# Patient Record
Sex: MALE | Race: BLACK OR AFRICAN AMERICAN | NOT HISPANIC OR LATINO
[De-identification: names, ages, dates, MRNs, and addresses within clinical notes are randomized per-mention and may not be internally consistent; named-entity substitution may affect disease eponyms.]

---

## 2022-03-21 PROBLEM — Z00.00 ENCOUNTER FOR PREVENTIVE HEALTH EXAMINATION: Status: ACTIVE | Noted: 2022-03-21

## 2022-03-25 ENCOUNTER — APPOINTMENT (OUTPATIENT)
Dept: UROLOGY | Facility: CLINIC | Age: 57
End: 2022-03-25
Payer: SELF-PAY

## 2022-03-25 VITALS
HEART RATE: 77 BPM | BODY MASS INDEX: 27.77 KG/M2 | TEMPERATURE: 98.3 F | DIASTOLIC BLOOD PRESSURE: 75 MMHG | WEIGHT: 205 LBS | HEIGHT: 72 IN | OXYGEN SATURATION: 97 % | SYSTOLIC BLOOD PRESSURE: 120 MMHG

## 2022-03-25 PROCEDURE — 51798 US URINE CAPACITY MEASURE: CPT

## 2022-03-25 PROCEDURE — 99204 OFFICE O/P NEW MOD 45 MIN: CPT

## 2022-03-25 RX ORDER — TAMSULOSIN HYDROCHLORIDE 0.4 MG/1
CAPSULE ORAL
Refills: 0 | Status: ACTIVE | COMMUNITY

## 2022-03-25 RX ORDER — CHROMIUM 200 MCG
TABLET ORAL
Refills: 0 | Status: ACTIVE | COMMUNITY

## 2022-03-25 RX ORDER — METFORMIN HYDROCHLORIDE 625 MG/1
TABLET ORAL
Refills: 0 | Status: ACTIVE | COMMUNITY

## 2022-03-28 LAB
APPEARANCE: CLEAR
BACTERIA UR CULT: NORMAL
BACTERIA: NEGATIVE
BILIRUBIN URINE: NEGATIVE
BLOOD URINE: NEGATIVE
COLOR: YELLOW
GLUCOSE QUALITATIVE U: NEGATIVE
HYALINE CASTS: 3 /LPF
KETONES URINE: NORMAL
LEUKOCYTE ESTERASE URINE: NEGATIVE
MICROSCOPIC-UA: NORMAL
NITRITE URINE: NEGATIVE
PH URINE: 5.5
PROTEIN URINE: NORMAL
PSA FREE FLD-MCNC: 7 %
PSA FREE SERPL-MCNC: 22.8 NG/ML
PSA SERPL-MCNC: 306 NG/ML
RED BLOOD CELLS URINE: 0 /HPF
SPECIFIC GRAVITY URINE: 1.03
SQUAMOUS EPITHELIAL CELLS: 1 /HPF
UROBILINOGEN URINE: NORMAL
WHITE BLOOD CELLS URINE: 1 /HPF

## 2022-03-29 ENCOUNTER — APPOINTMENT (OUTPATIENT)
Dept: UROLOGY | Facility: CLINIC | Age: 57
End: 2022-03-29
Payer: SELF-PAY

## 2022-03-29 VITALS
SYSTOLIC BLOOD PRESSURE: 125 MMHG | HEART RATE: 85 BPM | DIASTOLIC BLOOD PRESSURE: 80 MMHG | OXYGEN SATURATION: 100 % | BODY MASS INDEX: 27.8 KG/M2 | WEIGHT: 205 LBS | TEMPERATURE: 97.8 F

## 2022-03-29 PROCEDURE — 55700: CPT

## 2022-03-29 PROCEDURE — 76872 US TRANSRECTAL: CPT

## 2022-04-01 ENCOUNTER — APPOINTMENT (OUTPATIENT)
Dept: UROLOGY | Facility: CLINIC | Age: 57
End: 2022-04-01
Payer: SELF-PAY

## 2022-04-01 ENCOUNTER — NON-APPOINTMENT (OUTPATIENT)
Age: 57
End: 2022-04-01

## 2022-04-01 ENCOUNTER — RESULT REVIEW (OUTPATIENT)
Age: 57
End: 2022-04-01

## 2022-04-01 PROCEDURE — 99214 OFFICE O/P EST MOD 30 MIN: CPT

## 2022-04-02 ENCOUNTER — APPOINTMENT (OUTPATIENT)
Dept: CT IMAGING | Facility: IMAGING CENTER | Age: 57
End: 2022-04-02
Payer: SELF-PAY

## 2022-04-02 ENCOUNTER — OUTPATIENT (OUTPATIENT)
Dept: OUTPATIENT SERVICES | Facility: HOSPITAL | Age: 57
LOS: 1 days | End: 2022-04-02
Payer: SELF-PAY

## 2022-04-02 DIAGNOSIS — C61 MALIGNANT NEOPLASM OF PROSTATE: ICD-10-CM

## 2022-04-02 PROCEDURE — 74177 CT ABD & PELVIS W/CONTRAST: CPT | Mod: 26

## 2022-04-02 PROCEDURE — 74177 CT ABD & PELVIS W/CONTRAST: CPT

## 2022-04-04 ENCOUNTER — APPOINTMENT (OUTPATIENT)
Dept: NUCLEAR MEDICINE | Facility: IMAGING CENTER | Age: 57
End: 2022-04-04
Payer: SELF-PAY

## 2022-04-04 ENCOUNTER — OUTPATIENT (OUTPATIENT)
Dept: OUTPATIENT SERVICES | Facility: HOSPITAL | Age: 57
LOS: 1 days | End: 2022-04-04
Payer: SELF-PAY

## 2022-04-04 DIAGNOSIS — C61 MALIGNANT NEOPLASM OF PROSTATE: ICD-10-CM

## 2022-04-04 PROCEDURE — A9561: CPT

## 2022-04-04 PROCEDURE — 78306 BONE IMAGING WHOLE BODY: CPT

## 2022-04-04 PROCEDURE — 78306 BONE IMAGING WHOLE BODY: CPT | Mod: 26

## 2022-04-08 ENCOUNTER — TRANSCRIPTION ENCOUNTER (OUTPATIENT)
Age: 57
End: 2022-04-08

## 2022-04-09 NOTE — END OF VISIT
[FreeTextEntry3] : Medical record entries made by the scribe today, were at my direction and personally dictated to them by me, Dr. Chun Wong on 03/25/2022. I have reviewed the chart and agree that the record accurately reflects my personal performance of the history, physical exam, assessment, and plan.

## 2022-04-09 NOTE — HISTORY OF PRESENT ILLNESS
[FreeTextEntry1] : 56 year old male with elevated PSA\par IPSS: 6\par weak stream\par \par on flomax 0.4, proscar \par on diamicron - sulfonylurea\par \par denies pain, dysuria\par \par PSA 2/11/22: 291.70, 10.21% FREE\par \par to obtain records from old office\par prostate biopsy was recommended then patient postponed due to covid-lives in [place]-unable to get back here

## 2022-04-09 NOTE — ASSESSMENT
[FreeTextEntry1] : 56 year old male with Faina 8 (4+4) prostate cancer. PSA of 306\par 30 minute discussion with Pt and his mother(retired  RN) regarding pathology and options\par \par Significance of Honeydew 8 iscussed as well as elev psa to over 300\par \par Reviewed all options including active surveillance vs definitive therapy\par Recommended definitive therapy, we discussed rad surg, XRT, HIFU, cryo amongst others\par Risk/benefits reviewed as well- answered all questions. Patient understood \par \par if no evidence of spread- surgery is good option. patient agrees.\par to have CT scan, bone scan

## 2022-04-09 NOTE — ASSESSMENT
[FreeTextEntry1] : 56 year old male with elevated PSA\par UA micro, culture, Free PSA sent today\par \par PVR: 2\par Discussed need for prostate biopsy. Rationale, benefit, risk, and alternatives reviewed. All questions were answered. Pt understood. \par \par Follow up for prostate biopsy ASAP as pt is returning to [place] on 4/ 6

## 2022-04-09 NOTE — HISTORY OF PRESENT ILLNESS
[FreeTextEntry1] : 56 year old male is here to discuss biopsy results.\par  Pt is doing well since the procedure-\par \par Biopsy on 3/28/22 : right- adenocarcinoma of the prostate, Prostatic grade group 4. (Faina 4+4= 8) in 6 cores\par PSA 3/28/22: 306, 7% free\par \par exam 3/25 very asymmetric prostate, larger on the right, right has some nodularity. Smaller on the left. \par

## 2022-04-09 NOTE — PHYSICAL EXAM
[General Appearance - Well Developed] : well developed [General Appearance - Well Nourished] : well nourished [Normal Appearance] : normal appearance [Well Groomed] : well groomed [General Appearance - In No Acute Distress] : no acute distress [Edema] : no peripheral edema [Respiration, Rhythm And Depth] : normal respiratory rhythm and effort [Exaggerated Use Of Accessory Muscles For Inspiration] : no accessory muscle use [Abdomen Soft] : soft [Abdomen Tenderness] : non-tender [Costovertebral Angle Tenderness] : no ~M costovertebral angle tenderness [Urethral Meatus] : meatus normal [Urinary Bladder Findings] : the bladder was normal on palpation [Scrotum] : the scrotum was normal [Testes Mass (___cm)] : there were no testicular masses [Normal Station and Gait] : the gait and station were normal for the patient's age [] : no rash [No Focal Deficits] : no focal deficits [Oriented To Time, Place, And Person] : oriented to person, place, and time [Affect] : the affect was normal [Mood] : the mood was normal [Not Anxious] : not anxious [No Palpable Adenopathy] : no palpable adenopathy [Prostate Size ___ (0-4)] : prostate size [unfilled] (scale: 0-4) [Penis Abnormality] : normal uncircumcised penis [Epididymis] : the epididymides were normal [Testes Tenderness] : no tenderness of the testes [Anus Abnormality] : the anus and perineum were normal [Rectal Exam - Rectum] : digital rectal exam was normal [FreeTextEntry1] : very assymetric prostate, larger on the right, right has some nodularity. Smaller on the left

## 2022-04-09 NOTE — END OF VISIT
[FreeTextEntry3] : Medical record entries made by the scribe today, were at my direction and personally dictated to them by me, Dr. Chun Wong on 04/01/2022. I have reviewed the chart and agree that the record accurately reflects my personal performance of the history, physical exam, assessment, and plan.

## 2022-04-11 LAB — CORE LAB BIOPSY: NORMAL

## 2022-04-12 ENCOUNTER — TRANSCRIPTION ENCOUNTER (OUTPATIENT)
Age: 57
End: 2022-04-12

## 2022-04-12 ENCOUNTER — NON-APPOINTMENT (OUTPATIENT)
Age: 57
End: 2022-04-12

## 2022-04-18 ENCOUNTER — APPOINTMENT (OUTPATIENT)
Dept: UROLOGY | Facility: CLINIC | Age: 57
End: 2022-04-18
Payer: SELF-PAY

## 2022-04-18 VITALS
BODY MASS INDEX: 27.8 KG/M2 | OXYGEN SATURATION: 99 % | WEIGHT: 205 LBS | TEMPERATURE: 98.3 F | SYSTOLIC BLOOD PRESSURE: 124 MMHG | DIASTOLIC BLOOD PRESSURE: 78 MMHG | HEART RATE: 89 BPM

## 2022-04-18 DIAGNOSIS — R97.20 ELEVATED PROSTATE, SPECIFIC ANTIGEN [PSA]: ICD-10-CM

## 2022-04-18 DIAGNOSIS — N40.2 NODULAR PROSTATE W/OUT LOWER URINARY TRACT SYMPTOMS: ICD-10-CM

## 2022-04-18 PROCEDURE — 99215 OFFICE O/P EST HI 40 MIN: CPT

## 2022-04-18 NOTE — HISTORY OF PRESENT ILLNESS
[FreeTextEntry1] : 57 y/o with prostate cancer (PSA 306ng/mL, Faina 4+4, Mian 4).\par Prostate diameters: 4.5x4.1x5.3cm.\par CT negative for Lymphadenopathy.\par Bone scan negative for metastases.

## 2022-04-18 NOTE — ASSESSMENT
[FreeTextEntry1] : The patient was made aware of the all the possible therapeutic approaches for his disease.\par The chosen one was a multimodal approach, starting from a RALP + left monolaterale nerve sparing (if possible, to be checked intraoperatively) + bilateral LND with extended pattern (optional superextended in case needed).\par \par Possible continence and erection post/op issues discussed.\par Pt acknowledged about procedure details.\par \par Planned prostate MRI for Pre/OP planning (mostly about NS)\par \par

## 2022-04-18 NOTE — PHYSICAL EXAM
[General Appearance - Well Developed] : well developed [General Appearance - Well Nourished] : well nourished [Normal Appearance] : normal appearance [Well Groomed] : well groomed [General Appearance - In No Acute Distress] : no acute distress [Abdomen Soft] : soft [Abdomen Tenderness] : non-tender [Costovertebral Angle Tenderness] : no ~M costovertebral angle tenderness [Urethral Meatus] : meatus normal [Urinary Bladder Findings] : the bladder was normal on palpation [Scrotum] : the scrotum was normal [Testes Mass (___cm)] : there were no testicular masses [Prostate Size ___ (0-4)] : prostate size [unfilled] (scale: 0-4) [Edema] : no peripheral edema [] : no respiratory distress [Respiration, Rhythm And Depth] : normal respiratory rhythm and effort [Exaggerated Use Of Accessory Muscles For Inspiration] : no accessory muscle use [Oriented To Time, Place, And Person] : oriented to person, place, and time [Affect] : the affect was normal [Mood] : the mood was normal [Not Anxious] : not anxious [Normal Station and Gait] : the gait and station were normal for the patient's age [No Focal Deficits] : no focal deficits [No Palpable Adenopathy] : no palpable adenopathy [FreeTextEntry1] : Gland dimensions x3, right lobe with higher density, apparently fixed to the rectal wall. Left lobe soft. No pain upon palpation.

## 2022-05-10 ENCOUNTER — APPOINTMENT (OUTPATIENT)
Dept: MRI IMAGING | Facility: CLINIC | Age: 57
End: 2022-05-10

## 2022-05-18 ENCOUNTER — APPOINTMENT (OUTPATIENT)
Dept: MRI IMAGING | Facility: CLINIC | Age: 57
End: 2022-05-18

## 2022-06-01 ENCOUNTER — APPOINTMENT (OUTPATIENT)
Dept: NUCLEAR MEDICINE | Facility: CLINIC | Age: 57
End: 2022-06-01
Payer: COMMERCIAL

## 2022-06-01 ENCOUNTER — APPOINTMENT (OUTPATIENT)
Dept: NUCLEAR MEDICINE | Facility: CLINIC | Age: 57
End: 2022-06-01

## 2022-06-01 ENCOUNTER — OUTPATIENT (OUTPATIENT)
Dept: OUTPATIENT SERVICES | Facility: HOSPITAL | Age: 57
LOS: 1 days | End: 2022-06-01
Payer: SELF-PAY

## 2022-06-01 DIAGNOSIS — C61 MALIGNANT NEOPLASM OF PROSTATE: ICD-10-CM

## 2022-06-01 PROCEDURE — 78816 PET IMAGE W/CT FULL BODY: CPT

## 2022-06-01 PROCEDURE — 78816 PET IMAGE W/CT FULL BODY: CPT | Mod: 26

## 2022-06-01 PROCEDURE — A9595: CPT

## 2022-06-13 ENCOUNTER — OUTPATIENT (OUTPATIENT)
Dept: OUTPATIENT SERVICES | Facility: HOSPITAL | Age: 57
LOS: 1 days | End: 2022-06-13
Payer: COMMERCIAL

## 2022-06-13 VITALS
OXYGEN SATURATION: 97 % | RESPIRATION RATE: 16 BRPM | TEMPERATURE: 98 F | DIASTOLIC BLOOD PRESSURE: 82 MMHG | WEIGHT: 195.99 LBS | HEIGHT: 71.5 IN | SYSTOLIC BLOOD PRESSURE: 128 MMHG | HEART RATE: 68 BPM

## 2022-06-13 DIAGNOSIS — Z98.890 OTHER SPECIFIED POSTPROCEDURAL STATES: Chronic | ICD-10-CM

## 2022-06-13 DIAGNOSIS — C61 MALIGNANT NEOPLASM OF PROSTATE: ICD-10-CM

## 2022-06-13 DIAGNOSIS — I10 ESSENTIAL (PRIMARY) HYPERTENSION: ICD-10-CM

## 2022-06-13 DIAGNOSIS — E11.9 TYPE 2 DIABETES MELLITUS WITHOUT COMPLICATIONS: ICD-10-CM

## 2022-06-13 LAB
A1C WITH ESTIMATED AVERAGE GLUCOSE RESULT: 6.5 % — HIGH (ref 4–5.6)
ALBUMIN SERPL ELPH-MCNC: 4.9 G/DL — SIGNIFICANT CHANGE UP (ref 3.3–5)
ALP SERPL-CCNC: 56 U/L — SIGNIFICANT CHANGE UP (ref 40–120)
ALT FLD-CCNC: 12 U/L — SIGNIFICANT CHANGE UP (ref 4–41)
ANION GAP SERPL CALC-SCNC: 11 MMOL/L — SIGNIFICANT CHANGE UP (ref 7–14)
APPEARANCE UR: CLEAR — SIGNIFICANT CHANGE UP
AST SERPL-CCNC: 12 U/L — SIGNIFICANT CHANGE UP (ref 4–40)
BILIRUB SERPL-MCNC: 1 MG/DL — SIGNIFICANT CHANGE UP (ref 0.2–1.2)
BILIRUB UR-MCNC: NEGATIVE — SIGNIFICANT CHANGE UP
BLD GP AB SCN SERPL QL: NEGATIVE — SIGNIFICANT CHANGE UP
BUN SERPL-MCNC: 16 MG/DL — SIGNIFICANT CHANGE UP (ref 7–23)
CALCIUM SERPL-MCNC: 9.9 MG/DL — SIGNIFICANT CHANGE UP (ref 8.4–10.5)
CHLORIDE SERPL-SCNC: 104 MMOL/L — SIGNIFICANT CHANGE UP (ref 98–107)
CO2 SERPL-SCNC: 24 MMOL/L — SIGNIFICANT CHANGE UP (ref 22–31)
COLOR SPEC: YELLOW — SIGNIFICANT CHANGE UP
CREAT SERPL-MCNC: 1.3 MG/DL — SIGNIFICANT CHANGE UP (ref 0.5–1.3)
DIFF PNL FLD: NEGATIVE — SIGNIFICANT CHANGE UP
EGFR: 64 ML/MIN/1.73M2 — SIGNIFICANT CHANGE UP
ESTIMATED AVERAGE GLUCOSE: 140 — SIGNIFICANT CHANGE UP
GLUCOSE SERPL-MCNC: 127 MG/DL — HIGH (ref 70–99)
GLUCOSE UR QL: NEGATIVE — SIGNIFICANT CHANGE UP
HCT VFR BLD CALC: 38.9 % — LOW (ref 39–50)
HGB BLD-MCNC: 14 G/DL — SIGNIFICANT CHANGE UP (ref 13–17)
KETONES UR-MCNC: ABNORMAL
LEUKOCYTE ESTERASE UR-ACNC: NEGATIVE — SIGNIFICANT CHANGE UP
MCHC RBC-ENTMCNC: 28.9 PG — SIGNIFICANT CHANGE UP (ref 27–34)
MCHC RBC-ENTMCNC: 36 GM/DL — SIGNIFICANT CHANGE UP (ref 32–36)
MCV RBC AUTO: 80.4 FL — SIGNIFICANT CHANGE UP (ref 80–100)
NITRITE UR-MCNC: NEGATIVE — SIGNIFICANT CHANGE UP
NRBC # BLD: 0 /100 WBCS — SIGNIFICANT CHANGE UP
NRBC # FLD: 0 K/UL — SIGNIFICANT CHANGE UP
PH UR: 6 — SIGNIFICANT CHANGE UP (ref 5–8)
PLATELET # BLD AUTO: 198 K/UL — SIGNIFICANT CHANGE UP (ref 150–400)
POTASSIUM SERPL-MCNC: 4.5 MMOL/L — SIGNIFICANT CHANGE UP (ref 3.5–5.3)
POTASSIUM SERPL-SCNC: 4.5 MMOL/L — SIGNIFICANT CHANGE UP (ref 3.5–5.3)
PROT SERPL-MCNC: 7.8 G/DL — SIGNIFICANT CHANGE UP (ref 6–8.3)
PROT UR-MCNC: ABNORMAL
RBC # BLD: 4.84 M/UL — SIGNIFICANT CHANGE UP (ref 4.2–5.8)
RBC # FLD: 12.6 % — SIGNIFICANT CHANGE UP (ref 10.3–14.5)
RH IG SCN BLD-IMP: POSITIVE — SIGNIFICANT CHANGE UP
SODIUM SERPL-SCNC: 139 MMOL/L — SIGNIFICANT CHANGE UP (ref 135–145)
SP GR SPEC: 1.03 — SIGNIFICANT CHANGE UP (ref 1–1.05)
UROBILINOGEN FLD QL: SIGNIFICANT CHANGE UP
WBC # BLD: 7.85 K/UL — SIGNIFICANT CHANGE UP (ref 3.8–10.5)
WBC # FLD AUTO: 7.85 K/UL — SIGNIFICANT CHANGE UP (ref 3.8–10.5)

## 2022-06-13 PROCEDURE — 93010 ELECTROCARDIOGRAM REPORT: CPT

## 2022-06-13 RX ORDER — SODIUM CHLORIDE 9 MG/ML
3 INJECTION INTRAMUSCULAR; INTRAVENOUS; SUBCUTANEOUS EVERY 8 HOURS
Refills: 0 | Status: DISCONTINUED | OUTPATIENT
Start: 2022-06-15 | End: 2022-06-16

## 2022-06-13 RX ORDER — SODIUM CHLORIDE 9 MG/ML
1000 INJECTION, SOLUTION INTRAVENOUS
Refills: 0 | Status: DISCONTINUED | OUTPATIENT
Start: 2022-06-15 | End: 2022-06-15

## 2022-06-13 NOTE — H&P PST ADULT - HISTORY OF PRESENT ILLNESS
57 y/o male with H/O: DM type 2 57 y/o male with H/O: DM type 2, HTN presents for pre op evaluation with pre op diagnosis: malignant neoplasm of prostate. Schedule for robotic assisted laparoscopic prostatectomy, bilateral lymph node dissection

## 2022-06-13 NOTE — H&P PST ADULT - NSANTHOSAYNRD_GEN_A_CORE
No. KASHIF screening performed.  STOP BANG Legend: 0-2 = LOW Risk; 3-4 = INTERMEDIATE Risk; 5-8 = HIGH Risk

## 2022-06-13 NOTE — H&P PST ADULT - PROBLEM SELECTOR PLAN 2
Monitor BS A.M of surgery.  Pt instructed not to take any diabetes medications on day of surgery. Pt verbalized understanding.

## 2022-06-13 NOTE — H&P PST ADULT - NEGATIVE GENERAL GENITOURINARY SYMPTOMS
no hematuria/no renal colic/no flank pain R/no urine discoloration/no gas in urine/no dysuria/normal urinary frequency

## 2022-06-13 NOTE — H&P PST ADULT - PROBLEM SELECTOR PLAN 1
Schedule for robotic assisted laparoscopic prostatectomy, bilateral lymph node dissection tentatively on 06/15/2022. Pre op instructions, famotidine chlorhexidine gluconate soap given and explained. Pt verbalized understanding.  covid 19 test ordered. List of [facility] testing sites given to pt who verbalized understanding.

## 2022-06-13 NOTE — H&P PST ADULT - NSICDXPASTMEDICALHX_GEN_ALL_CORE_FT
PAST MEDICAL HISTORY:  HTN (hypertension)     Malignant neoplasm of prostate     Type 2 diabetes mellitus

## 2022-06-14 ENCOUNTER — TRANSCRIPTION ENCOUNTER (OUTPATIENT)
Age: 57
End: 2022-06-14

## 2022-06-14 LAB
CULTURE RESULTS: SIGNIFICANT CHANGE UP
SARS-COV-2 RNA SPEC QL NAA+PROBE: SIGNIFICANT CHANGE UP
SPECIMEN SOURCE: SIGNIFICANT CHANGE UP

## 2022-06-15 ENCOUNTER — RESULT REVIEW (OUTPATIENT)
Age: 57
End: 2022-06-15

## 2022-06-15 ENCOUNTER — INPATIENT (INPATIENT)
Facility: HOSPITAL | Age: 57
LOS: 0 days | Discharge: HOME | End: 2022-06-16
Attending: UROLOGY | Admitting: UROLOGY
Payer: COMMERCIAL

## 2022-06-15 ENCOUNTER — APPOINTMENT (OUTPATIENT)
Dept: UROLOGY | Facility: HOSPITAL | Age: 57
End: 2022-06-15

## 2022-06-15 VITALS
DIASTOLIC BLOOD PRESSURE: 72 MMHG | RESPIRATION RATE: 16 BRPM | SYSTOLIC BLOOD PRESSURE: 134 MMHG | HEART RATE: 80 BPM | TEMPERATURE: 98 F | WEIGHT: 195.99 LBS | OXYGEN SATURATION: 99 % | HEIGHT: 71.5 IN

## 2022-06-15 DIAGNOSIS — C61 MALIGNANT NEOPLASM OF PROSTATE: ICD-10-CM

## 2022-06-15 DIAGNOSIS — Z98.890 OTHER SPECIFIED POSTPROCEDURAL STATES: Chronic | ICD-10-CM

## 2022-06-15 LAB
GAS PNL BLDA: SIGNIFICANT CHANGE UP
GLUCOSE BLDC GLUCOMTR-MCNC: 193 MG/DL — HIGH (ref 70–99)
GLUCOSE BLDC GLUCOMTR-MCNC: 229 MG/DL — HIGH (ref 70–99)
GLUCOSE BLDC GLUCOMTR-MCNC: 253 MG/DL — HIGH (ref 70–99)
GLUCOSE BLDC GLUCOMTR-MCNC: 257 MG/DL — HIGH (ref 70–99)

## 2022-06-15 PROCEDURE — 88309 TISSUE EXAM BY PATHOLOGIST: CPT | Mod: 26

## 2022-06-15 PROCEDURE — 55866 LAPS SURG PRST8ECT RPBIC RAD: CPT

## 2022-06-15 PROCEDURE — 88344 IMHCHEM/IMCYTCHM EA MLT ANTB: CPT | Mod: 26

## 2022-06-15 PROCEDURE — 38571 LAPAROSCOPY LYMPHADENECTOMY: CPT

## 2022-06-15 PROCEDURE — 88305 TISSUE EXAM BY PATHOLOGIST: CPT | Mod: 26

## 2022-06-15 PROCEDURE — 88307 TISSUE EXAM BY PATHOLOGIST: CPT | Mod: 26

## 2022-06-15 PROCEDURE — S2900 ROBOTIC SURGICAL SYSTEM: CPT

## 2022-06-15 DEVICE — SURGICEL 2 X 14": Type: IMPLANTABLE DEVICE | Status: FUNCTIONAL

## 2022-06-15 DEVICE — LIGATING CLIPS WECK HEMOLOK POLYMER LARGE (PURPLE) 6: Type: IMPLANTABLE DEVICE | Status: FUNCTIONAL

## 2022-06-15 RX ORDER — SODIUM CHLORIDE 9 MG/ML
1000 INJECTION, SOLUTION INTRAVENOUS
Refills: 0 | Status: DISCONTINUED | OUTPATIENT
Start: 2022-06-15 | End: 2022-06-16

## 2022-06-15 RX ORDER — DEXTROSE 50 % IN WATER 50 %
25 SYRINGE (ML) INTRAVENOUS ONCE
Refills: 0 | Status: DISCONTINUED | OUTPATIENT
Start: 2022-06-15 | End: 2022-06-16

## 2022-06-15 RX ORDER — KETOROLAC TROMETHAMINE 30 MG/ML
15 SYRINGE (ML) INJECTION EVERY 8 HOURS
Refills: 0 | Status: DISCONTINUED | OUTPATIENT
Start: 2022-06-15 | End: 2022-06-16

## 2022-06-15 RX ORDER — HYDRALAZINE HCL 50 MG
5 TABLET ORAL ONCE
Refills: 0 | Status: COMPLETED | OUTPATIENT
Start: 2022-06-15 | End: 2022-06-15

## 2022-06-15 RX ORDER — GLUCAGON INJECTION, SOLUTION 0.5 MG/.1ML
1 INJECTION, SOLUTION SUBCUTANEOUS ONCE
Refills: 0 | Status: DISCONTINUED | OUTPATIENT
Start: 2022-06-15 | End: 2022-06-16

## 2022-06-15 RX ORDER — ONDANSETRON 8 MG/1
4 TABLET, FILM COATED ORAL ONCE
Refills: 0 | Status: DISCONTINUED | OUTPATIENT
Start: 2022-06-15 | End: 2022-06-15

## 2022-06-15 RX ORDER — PREGABALIN 225 MG/1
1 CAPSULE ORAL
Qty: 0 | Refills: 0 | DISCHARGE

## 2022-06-15 RX ORDER — HYDROMORPHONE HYDROCHLORIDE 2 MG/ML
0.5 INJECTION INTRAMUSCULAR; INTRAVENOUS; SUBCUTANEOUS
Refills: 0 | Status: DISCONTINUED | OUTPATIENT
Start: 2022-06-15 | End: 2022-06-15

## 2022-06-15 RX ORDER — LOSARTAN POTASSIUM 100 MG/1
1 TABLET, FILM COATED ORAL
Qty: 0 | Refills: 0 | DISCHARGE

## 2022-06-15 RX ORDER — FERROUS SULFATE 325(65) MG
0 TABLET ORAL
Qty: 0 | Refills: 0 | DISCHARGE

## 2022-06-15 RX ORDER — HEPARIN SODIUM 5000 [USP'U]/ML
5000 INJECTION INTRAVENOUS; SUBCUTANEOUS EVERY 8 HOURS
Refills: 0 | Status: DISCONTINUED | OUTPATIENT
Start: 2022-06-15 | End: 2022-06-16

## 2022-06-15 RX ORDER — ATORVASTATIN CALCIUM 80 MG/1
1 TABLET, FILM COATED ORAL
Qty: 0 | Refills: 0 | DISCHARGE

## 2022-06-15 RX ORDER — ACETAMINOPHEN 500 MG
1000 TABLET ORAL EVERY 6 HOURS
Refills: 0 | Status: COMPLETED | OUTPATIENT
Start: 2022-06-15 | End: 2022-06-16

## 2022-06-15 RX ORDER — OXYCODONE HYDROCHLORIDE 5 MG/1
5 TABLET ORAL EVERY 4 HOURS
Refills: 0 | Status: DISCONTINUED | OUTPATIENT
Start: 2022-06-15 | End: 2022-06-16

## 2022-06-15 RX ORDER — ATORVASTATIN CALCIUM 80 MG/1
10 TABLET, FILM COATED ORAL AT BEDTIME
Refills: 0 | Status: DISCONTINUED | OUTPATIENT
Start: 2022-06-15 | End: 2022-06-16

## 2022-06-15 RX ORDER — SITAGLIPTIN 50 MG/1
1 TABLET, FILM COATED ORAL
Qty: 0 | Refills: 0 | DISCHARGE

## 2022-06-15 RX ORDER — LOSARTAN POTASSIUM 100 MG/1
25 TABLET, FILM COATED ORAL DAILY
Refills: 0 | Status: DISCONTINUED | OUTPATIENT
Start: 2022-06-15 | End: 2022-06-16

## 2022-06-15 RX ORDER — METFORMIN HYDROCHLORIDE 850 MG/1
1 TABLET ORAL
Qty: 0 | Refills: 0 | DISCHARGE

## 2022-06-15 RX ORDER — HYDROMORPHONE HYDROCHLORIDE 2 MG/ML
1 INJECTION INTRAMUSCULAR; INTRAVENOUS; SUBCUTANEOUS
Refills: 0 | Status: DISCONTINUED | OUTPATIENT
Start: 2022-06-15 | End: 2022-06-15

## 2022-06-15 RX ORDER — FERROUS SULFATE 325(65) MG
325 TABLET ORAL DAILY
Refills: 0 | Status: DISCONTINUED | OUTPATIENT
Start: 2022-06-15 | End: 2022-06-16

## 2022-06-15 RX ORDER — METOCLOPRAMIDE HCL 10 MG
10 TABLET ORAL EVERY 6 HOURS
Refills: 0 | Status: DISCONTINUED | OUTPATIENT
Start: 2022-06-15 | End: 2022-06-16

## 2022-06-15 RX ORDER — INFLUENZA VIRUS VACCINE 15; 15; 15; 15 UG/.5ML; UG/.5ML; UG/.5ML; UG/.5ML
0.5 SUSPENSION INTRAMUSCULAR ONCE
Refills: 0 | Status: DISCONTINUED | OUTPATIENT
Start: 2022-06-15 | End: 2022-06-16

## 2022-06-15 RX ORDER — DEXTROSE 50 % IN WATER 50 %
15 SYRINGE (ML) INTRAVENOUS ONCE
Refills: 0 | Status: DISCONTINUED | OUTPATIENT
Start: 2022-06-15 | End: 2022-06-16

## 2022-06-15 RX ORDER — CHOLECALCIFEROL (VITAMIN D3) 125 MCG
1 CAPSULE ORAL
Qty: 0 | Refills: 0 | DISCHARGE

## 2022-06-15 RX ORDER — SENNA PLUS 8.6 MG/1
2 TABLET ORAL AT BEDTIME
Refills: 0 | Status: DISCONTINUED | OUTPATIENT
Start: 2022-06-15 | End: 2022-06-16

## 2022-06-15 RX ORDER — INSULIN LISPRO 100/ML
VIAL (ML) SUBCUTANEOUS
Refills: 0 | Status: DISCONTINUED | OUTPATIENT
Start: 2022-06-15 | End: 2022-06-16

## 2022-06-15 RX ORDER — INSULIN LISPRO 100/ML
VIAL (ML) SUBCUTANEOUS AT BEDTIME
Refills: 0 | Status: DISCONTINUED | OUTPATIENT
Start: 2022-06-15 | End: 2022-06-16

## 2022-06-15 RX ORDER — LIDOCAINE 4 G/100G
1 CREAM TOPICAL
Refills: 0 | Status: DISCONTINUED | OUTPATIENT
Start: 2022-06-15 | End: 2022-06-16

## 2022-06-15 RX ORDER — DEXTROSE 50 % IN WATER 50 %
12.5 SYRINGE (ML) INTRAVENOUS ONCE
Refills: 0 | Status: DISCONTINUED | OUTPATIENT
Start: 2022-06-15 | End: 2022-06-16

## 2022-06-15 RX ADMIN — Medication 260 MILLIGRAM(S): at 19:40

## 2022-06-15 RX ADMIN — HEPARIN SODIUM 5000 UNIT(S): 5000 INJECTION INTRAVENOUS; SUBCUTANEOUS at 22:35

## 2022-06-15 RX ADMIN — SENNA PLUS 2 TABLET(S): 8.6 TABLET ORAL at 22:35

## 2022-06-15 RX ADMIN — Medication 15 MILLIGRAM(S): at 14:00

## 2022-06-15 RX ADMIN — Medication 400 MILLIGRAM(S): at 17:30

## 2022-06-15 RX ADMIN — HEPARIN SODIUM 5000 UNIT(S): 5000 INJECTION INTRAVENOUS; SUBCUTANEOUS at 13:36

## 2022-06-15 RX ADMIN — SODIUM CHLORIDE 3 MILLILITER(S): 9 INJECTION INTRAMUSCULAR; INTRAVENOUS; SUBCUTANEOUS at 13:42

## 2022-06-15 RX ADMIN — Medication 5 MILLIGRAM(S): at 13:36

## 2022-06-15 RX ADMIN — Medication 4: at 17:31

## 2022-06-15 RX ADMIN — SODIUM CHLORIDE 3 MILLILITER(S): 9 INJECTION INTRAMUSCULAR; INTRAVENOUS; SUBCUTANEOUS at 22:58

## 2022-06-15 RX ADMIN — SODIUM CHLORIDE 125 MILLILITER(S): 9 INJECTION, SOLUTION INTRAVENOUS at 12:53

## 2022-06-15 RX ADMIN — Medication 6: at 12:50

## 2022-06-15 RX ADMIN — Medication 15 MILLIGRAM(S): at 13:36

## 2022-06-15 RX ADMIN — ATORVASTATIN CALCIUM 10 MILLIGRAM(S): 80 TABLET, FILM COATED ORAL at 22:35

## 2022-06-15 RX ADMIN — Medication 15 MILLIGRAM(S): at 22:36

## 2022-06-15 NOTE — PATIENT PROFILE ADULT - NSPROIMPLANTSMEDDEV_GEN_A_NUR
Eyes with no visual disturbances.  Ears clean and dry and no hearing difficulties. Nose with pink mucosa and no drainage.  Mouth mucous membranes moist and pink.  No tenderness or swelling to throat or neck.
None

## 2022-06-15 NOTE — PROGRESS NOTE ADULT - SUBJECTIVE AND OBJECTIVE BOX
Note    Post op Check    s/p: RALP b/l PLND    Pt seen / examined c/o tiredness, mild Nausea,  pain controlled    Vital Signs Last 24 Hrs  T(C): 37.1 (15 Matias 2022 15:00), Max: 37.1 (15 Matias 2022 15:00)  T(F): 98.8 (15 Matias 2022 15:00), Max: 98.8 (15 Matias 2022 15:00)  HR: 75 (15 Matias 2022 16:00) (70 - 85)  BP: 124/70 (15 Matias 2022 16:00) (124/70 - 155/82)  BP(mean): 82 (15 Matias 2022 16:00) (80 - 99)  RR: 14 (15 Matias 2022 16:00) (8 - 16)  SpO2: 100% (15 Matias 2022 16:00) (99% - 100%)    I&O's Summary    15 Matias 2022 07:01  -  15 Matias 2022 16:29  --------------------------------------------------------  IN: 625 mL / OUT: 609 mL / NET: 16 mL    F-450  BING-160    PHYSICAL EXAM:       Constitutional: awake alert NAD    Respiratory: no resp distress    Cardiovascular: RRR    Gastrointestinal: softly distended, appropriately tender, trocar sites CDI; BING serosanguinous    Genitourinary: pantoja in place, draining well, light red    Extremities: + venodynes

## 2022-06-15 NOTE — ASU PREOP CHECKLIST - COMMENTS
pt took this 0400 with water losartan pepcid and pt took this 0400 with water losartan pepcid and atorvstatan

## 2022-06-15 NOTE — PATIENT PROFILE ADULT - FALL HARM RISK - HARM RISK INTERVENTIONS
oral Assistance with ambulation/Assistance OOB with selected safe patient handling equipment/Communicate Risk of Fall with Harm to all staff/Monitor gait and stability/Reinforce activity limits and safety measures with patient and family/Sit up slowly, dangle for a short time, stand at bedside before walking/Tailored Fall Risk Interventions/Use of alarms - bed, chair and/or voice tab/Visual Cue: Yellow wristband and red socks/Bed in lowest position, wheels locked, appropriate side rails in place/Call bell, personal items and telephone in reach/Instruct patient to call for assistance before getting out of bed or chair/Non-slip footwear when patient is out of bed/[place] to call system/Physically safe environment - no spills, clutter or unnecessary equipment/Purposeful Proactive Rounding/Room/bathroom lighting operational, light cord in reach

## 2022-06-15 NOTE — BRIEF OPERATIVE NOTE - OPERATION/FINDINGS
extended node dissection, right side gross extraprostatic spread, multiple margins taken sent permanent

## 2022-06-15 NOTE — BRIEF OPERATIVE NOTE - NSICDXBRIEFPROCEDURE_GEN_ALL_CORE_FT
PROCEDURES:  Robotic radical prostatectomy 15-Matias-2022 12:33:19 superextended lymphadenectomy Ranjan Kirk

## 2022-06-16 ENCOUNTER — TRANSCRIPTION ENCOUNTER (OUTPATIENT)
Age: 57
End: 2022-06-16

## 2022-06-16 VITALS
OXYGEN SATURATION: 100 % | SYSTOLIC BLOOD PRESSURE: 114 MMHG | HEART RATE: 87 BPM | RESPIRATION RATE: 17 BRPM | TEMPERATURE: 99 F | DIASTOLIC BLOOD PRESSURE: 69 MMHG

## 2022-06-16 DIAGNOSIS — D64.9 ANEMIA, UNSPECIFIED: ICD-10-CM

## 2022-06-16 DIAGNOSIS — E11.9 TYPE 2 DIABETES MELLITUS WITHOUT COMPLICATIONS: ICD-10-CM

## 2022-06-16 DIAGNOSIS — Z29.9 ENCOUNTER FOR PROPHYLACTIC MEASURES, UNSPECIFIED: ICD-10-CM

## 2022-06-16 LAB
ANION GAP SERPL CALC-SCNC: 8 MMOL/L — SIGNIFICANT CHANGE UP (ref 7–14)
BASOPHILS # BLD AUTO: 0.01 K/UL — SIGNIFICANT CHANGE UP (ref 0–0.2)
BASOPHILS NFR BLD AUTO: 0.1 % — SIGNIFICANT CHANGE UP (ref 0–2)
BUN SERPL-MCNC: 10 MG/DL — SIGNIFICANT CHANGE UP (ref 7–23)
CALCIUM SERPL-MCNC: 8.4 MG/DL — SIGNIFICANT CHANGE UP (ref 8.4–10.5)
CHLORIDE SERPL-SCNC: 103 MMOL/L — SIGNIFICANT CHANGE UP (ref 98–107)
CO2 SERPL-SCNC: 27 MMOL/L — SIGNIFICANT CHANGE UP (ref 22–31)
CREAT FLD-MCNC: 1.18 MG/DL — SIGNIFICANT CHANGE UP
CREAT SERPL-MCNC: 1.2 MG/DL — SIGNIFICANT CHANGE UP (ref 0.5–1.3)
EGFR: 71 ML/MIN/1.73M2 — SIGNIFICANT CHANGE UP
EOSINOPHIL # BLD AUTO: 0.02 K/UL — SIGNIFICANT CHANGE UP (ref 0–0.5)
EOSINOPHIL NFR BLD AUTO: 0.2 % — SIGNIFICANT CHANGE UP (ref 0–6)
GLUCOSE BLDC GLUCOMTR-MCNC: 187 MG/DL — HIGH (ref 70–99)
GLUCOSE BLDC GLUCOMTR-MCNC: 189 MG/DL — HIGH (ref 70–99)
GLUCOSE BLDC GLUCOMTR-MCNC: 255 MG/DL — HIGH (ref 70–99)
GLUCOSE BLDC GLUCOMTR-MCNC: 274 MG/DL — HIGH (ref 70–99)
GLUCOSE SERPL-MCNC: 123 MG/DL — HIGH (ref 70–99)
HCT VFR BLD CALC: 29.3 % — LOW (ref 39–50)
HGB BLD-MCNC: 10.4 G/DL — LOW (ref 13–17)
IANC: 6.45 K/UL — SIGNIFICANT CHANGE UP (ref 1.8–7.4)
IMM GRANULOCYTES NFR BLD AUTO: 0.5 % — SIGNIFICANT CHANGE UP (ref 0–1.5)
LYMPHOCYTES # BLD AUTO: 1.39 K/UL — SIGNIFICANT CHANGE UP (ref 1–3.3)
LYMPHOCYTES # BLD AUTO: 16 % — SIGNIFICANT CHANGE UP (ref 13–44)
MCHC RBC-ENTMCNC: 29.3 PG — SIGNIFICANT CHANGE UP (ref 27–34)
MCHC RBC-ENTMCNC: 35.5 GM/DL — SIGNIFICANT CHANGE UP (ref 32–36)
MCV RBC AUTO: 82.5 FL — SIGNIFICANT CHANGE UP (ref 80–100)
MONOCYTES # BLD AUTO: 0.79 K/UL — SIGNIFICANT CHANGE UP (ref 0–0.9)
MONOCYTES NFR BLD AUTO: 9.1 % — SIGNIFICANT CHANGE UP (ref 2–14)
NEUTROPHILS # BLD AUTO: 6.45 K/UL — SIGNIFICANT CHANGE UP (ref 1.8–7.4)
NEUTROPHILS NFR BLD AUTO: 74.1 % — SIGNIFICANT CHANGE UP (ref 43–77)
NRBC # BLD: 0 /100 WBCS — SIGNIFICANT CHANGE UP
NRBC # FLD: 0 K/UL — SIGNIFICANT CHANGE UP
PLATELET # BLD AUTO: 137 K/UL — LOW (ref 150–400)
POTASSIUM SERPL-MCNC: 3.6 MMOL/L — SIGNIFICANT CHANGE UP (ref 3.5–5.3)
POTASSIUM SERPL-SCNC: 3.6 MMOL/L — SIGNIFICANT CHANGE UP (ref 3.5–5.3)
RBC # BLD: 3.55 M/UL — LOW (ref 4.2–5.8)
RBC # FLD: 12.2 % — SIGNIFICANT CHANGE UP (ref 10.3–14.5)
SODIUM SERPL-SCNC: 138 MMOL/L — SIGNIFICANT CHANGE UP (ref 135–145)
WBC # BLD: 8.7 K/UL — SIGNIFICANT CHANGE UP (ref 3.8–10.5)
WBC # FLD AUTO: 8.7 K/UL — SIGNIFICANT CHANGE UP (ref 3.8–10.5)

## 2022-06-16 PROCEDURE — 99223 1ST HOSP IP/OBS HIGH 75: CPT

## 2022-06-16 RX ORDER — ACETAMINOPHEN 500 MG
2 TABLET ORAL
Qty: 0 | Refills: 0 | DISCHARGE

## 2022-06-16 RX ORDER — IBUPROFEN 200 MG
1 TABLET ORAL
Qty: 20 | Refills: 0
Start: 2022-06-16

## 2022-06-16 RX ORDER — SENNA PLUS 8.6 MG/1
2 TABLET ORAL
Qty: 0 | Refills: 0 | DISCHARGE
Start: 2022-06-16

## 2022-06-16 RX ORDER — SODIUM CHLORIDE 9 MG/ML
1000 INJECTION, SOLUTION INTRAVENOUS
Refills: 0 | Status: DISCONTINUED | OUTPATIENT
Start: 2022-06-16 | End: 2022-06-16

## 2022-06-16 RX ORDER — LIDOCAINE 4 G/100G
1 CREAM TOPICAL
Qty: 15 | Refills: 0
Start: 2022-06-16

## 2022-06-16 RX ADMIN — Medication 400 MILLIGRAM(S): at 00:31

## 2022-06-16 RX ADMIN — SODIUM CHLORIDE 75 MILLILITER(S): 9 INJECTION, SOLUTION INTRAVENOUS at 07:40

## 2022-06-16 RX ADMIN — Medication 325 MILLIGRAM(S): at 11:33

## 2022-06-16 RX ADMIN — Medication 2: at 07:41

## 2022-06-16 RX ADMIN — Medication 400 MILLIGRAM(S): at 11:33

## 2022-06-16 RX ADMIN — Medication 400 MILLIGRAM(S): at 05:40

## 2022-06-16 RX ADMIN — Medication 260 MILLIGRAM(S): at 06:13

## 2022-06-16 RX ADMIN — SODIUM CHLORIDE 3 MILLILITER(S): 9 INJECTION INTRAMUSCULAR; INTRAVENOUS; SUBCUTANEOUS at 06:05

## 2022-06-16 RX ADMIN — Medication 1000 MILLIGRAM(S): at 06:00

## 2022-06-16 RX ADMIN — Medication 6: at 11:34

## 2022-06-16 RX ADMIN — HEPARIN SODIUM 5000 UNIT(S): 5000 INJECTION INTRAVENOUS; SUBCUTANEOUS at 05:41

## 2022-06-16 RX ADMIN — SODIUM CHLORIDE 3 MILLILITER(S): 9 INJECTION INTRAMUSCULAR; INTRAVENOUS; SUBCUTANEOUS at 14:10

## 2022-06-16 NOTE — DISCHARGE NOTE PROVIDER - CARE PROVIDER_API CALL
Josue Kiran  [address] [facility] [address]nd [place] [address]  Phone: [phone]  Fax: (   )    -  Follow Up Time:     Kike Anderson (DO)  Family Medicine  [address] [place] [address]  Phone: [phone]  Fax: [phone]  Follow Up Time:

## 2022-06-16 NOTE — DISCHARGE NOTE NURSING/CASE MANAGEMENT/SOCIAL WORK - NSDCPNINST_GEN_ALL_CORE
Maintain abdominal incisions clean dry and intact, steri strips will fall off on their own in 1-2 weeks. Call MD with any signs of infection ie fever/shaking chills, redness or drainage, or with signs of bleeding or persistent nausea. Continue to drink plenty of fluids and avoid straining and constipation which may be a side effect from taking narcotic pain meds. No heavy lifting greater than 10 pounds (ie a gallon of milk.)   Continue Diabetes management, diet and glucose monitoring, know your A1C blood level and follow up with PMD for continuity of care. Remember hand hygiene, skin inspection for prevention of infection.  Follow-up with MD as well as PMD in office as instructed for continuity of care post-operatively, as per safe DC plan.

## 2022-06-16 NOTE — DISCHARGE NOTE PROVIDER - HOSPITAL COURSE
57 yo M underwent RALP/ b/l LND on 6/15/2022.  Postoperative course uneventful, pain controlled, urine remained acceptable in color, ambulating.  Return of GI function on POD #1, diet advanced without incident.  BING Cr < serum, BING d/c and pt d/c with pantoja to leg bag to f/u with Dr. Mckinney.  Per medicine since Januvia is very expensive pt advised to increase Metformin to 750mg BID once Januvia is complete, continue diet management and follow up with his PMD Dr. Anderson

## 2022-06-16 NOTE — DISCHARGE NOTE PROVIDER - NSDCMRMEDTOKEN_GEN_ALL_CORE_FT
acetaminophen 325 mg oral tablet: 3 tablets every 6 hours as needed for pain, alternate with ibuprofen  atorvastatin 10 mg oral tablet: 1 tab(s) orally once a day  ferrous sulfate 324 mg (65 mg elemental iron) oral tablet: orally once a day  ibuprofen 600 mg oral tablet: 1 tablet every 6 hours as needed for pain, alternate with acetaminophen  Januvia 100 mg oral tablet: 1 tab(s) orally 2 times a day  lidocaine 5% topical ointment: Apply a small amount to tip of penis 4 times a day as needed for catheter irritation  losartan 25 mg oral tablet: 1 tab(s) orally once a day  metFORMIN 500 mg oral tablet: 1 tab(s) orally 2 times a day  senna oral tablet: 2 tab(s) orally once a day (at bedtime)  sulfamethoxazole-trimethoprim 800 mg-160 mg oral tablet: 1 tab(s) orally every 12 hours   Vitamin B-12: 1 tab(s) orally once a day am  Vitamin D3: 1 cap(s) orally once a day am

## 2022-06-16 NOTE — DISCHARGE NOTE PROVIDER - NSDCCPCAREPLAN_GEN_ALL_CORE_FT
PRINCIPAL DISCHARGE DIAGNOSIS  Diagnosis: Cancer of prostate  Assessment and Plan of Treatment: Empty pantoja bag as needed as instructed.  Keep hydrated.  No heavy lifting or straining for 4 to 6 weeks, avoid constipation. You may have intermittent pink tinged urine and small amounts of leakage around pantoja (due to bladder spasms).  This is normal.   If your urine becomes bright red or with clots, or there is no urine in the bag, please call the office. You may shower.  Change dressing at drain site daily or as needed until dry.  Dr. Kiran's office will call you to schedule a follow up appointment next week for pantoja removal and further management.  Call the office if you have fever greater than 101, no urine in bag, pain not relieved with pain medication, nausea/vomiting.        SECONDARY DISCHARGE DIAGNOSES  Diagnosis: Type 2 diabetes mellitus  Assessment and Plan of Treatment: Continue Metformin and Januvia, once you are finished with the Januvia (given the cost), increase your metformin to 750mg twice a day and follow up with Dr. Anderson

## 2022-06-16 NOTE — CONSULT NOTE ADULT - SUBJECTIVE AND OBJECTIVE BOX
CHIEF COMPLAINT: Patient is a 56y old  Male who presents with a chief complaint of Prostate Cancer (15 Matias 2022 16:28)      HPI: 55 y/o man PMH DM type 2,  malignant neoplasm of prostate presents for scheduled robotic assisted laparoscopic prostatectomy, bilateral lymph node dissection. Patient now doing well postop. Denies chest pain, SOB, N/V. Passing flatus but no BM, pain is rated 3/10 in abdomen. Asked about Januvia which he says was prescribed to replace a different medication he was taking before but it costs him $500 a month and he picked up one about 2 weeks ago. His Diabetes is otherwise well controlled.        Pain Symptoms if applicable:             	                         none	   mild         moderate         severe  	                            0	    1-3	     4-6	         7-10  Pain:  Location:	  Modifying factors:	  Associated symptoms:	    Allergies    No Known Allergies    Intolerances        HOME MEDICATIONS: [x] Reviewed    MEDICATIONS  (STANDING):  atorvastatin 10 milliGRAM(s) Oral at bedtime  dextrose 5% + sodium chloride 0.45%. 1000 milliLiter(s) (75 mL/Hr) IV Continuous <Continuous>  dextrose 5%. 1000 milliLiter(s) (50 mL/Hr) IV Continuous <Continuous>  dextrose 5%. 1000 milliLiter(s) (100 mL/Hr) IV Continuous <Continuous>  dextrose 50% Injectable 25 Gram(s) IV Push once  dextrose 50% Injectable 12.5 Gram(s) IV Push once  dextrose 50% Injectable 25 Gram(s) IV Push once  ferrous    sulfate 325 milliGRAM(s) Oral daily  glucagon  Injectable 1 milliGRAM(s) IntraMuscular once  heparin   Injectable 5000 Unit(s) SubCutaneous every 8 hours  influenza   Vaccine 0.5 milliLiter(s) IntraMuscular once  insulin lispro (ADMELOG) corrective regimen sliding scale   SubCutaneous three times a day before meals  insulin lispro (ADMELOG) corrective regimen sliding scale   SubCutaneous at bedtime  ketorolac   Injectable 15 milliGRAM(s) IV Push every 8 hours  lidocaine 5% Ointment 1 Application(s) Topical every 3 hours  losartan 25 milliGRAM(s) Oral daily  senna 2 Tablet(s) Oral at bedtime  sodium chloride 0.9% lock flush 3 milliLiter(s) IV Push every 8 hours  trimethoprim / sulfamethoxazole IVPB 160 milliGRAM(s) IV Intermittent two times a day    MEDICATIONS  (PRN):  dextrose Oral Gel 15 Gram(s) Oral once PRN Blood Glucose LESS THAN 70 milliGRAM(s)/deciliter  metoclopramide Injectable 10 milliGRAM(s) IV Push every 6 hours PRN Nausea and/or Vomiting  oxyCODONE    IR 5 milliGRAM(s) Oral every 4 hours PRN Severe Pain (7 - 10)      PAST MEDICAL & SURGICAL HISTORY:  Type 2 diabetes mellitus      HTN (hypertension)      Malignant neoplasm of prostate      H/O prostate biopsy      [X ] Reviewed     SOCIAL HISTORY:  [x] Substance abuse: denies  [x] Tobacco: denies  [x] Alcohol use: denies    FAMILY HISTORY:  [x] No pertinent family history in first degree relatives     REVIEW OF SYSTEMS:    [x] All other ROS negative  [  ] Unable to obtain due to poor mental status    Vital Signs Last 24 Hrs  T(C): 36.4 (16 Jun 2022 09:46), Max: 37.2 (15 Matias 2022 16:29)  T(F): 97.6 (16 Jun 2022 09:46), Max: 98.9 (15 Matias 2022 16:29)  HR: 88 (16 Jun 2022 09:46) (70 - 89)  BP: 129/70 (16 Jun 2022 09:46) (107/61 - 155/82)  BP(mean): 82 (15 Matias 2022 16:00) (80 - 99)  RR: 18 (16 Jun 2022 09:46) (8 - 18)  SpO2: 99% (16 Jun 2022 09:46) (99% - 100%)    PHYSICAL EXAM:    GENERAL: NAD, well-groomed, well-developed, on room air, seated in bedside chair  HEAD:  Atraumatic, Normocephalic  EYES: sclera clear  ENMT: Moist mucous membranes  NECK: Supple, No JVD  RESPIRATORY: Clear to auscultation bilaterally; No rales, rhonchi, wheezing, or rubs  CARDIOVASCULAR: Regular rate and rhythm; No murmurs, rubs, or gallops appreciated  GASTROINTESTINAL: Soft, Nontender, Nondistended; Bowel sounds present  GENITOURINARY: + pantoja  EXTREMITIES:  WWP, no edema  NERVOUS SYSTEM:  Alert & Oriented X3; Moving all 4 extremities; No gross deficits  PSYCH: calm cooperative  SKIN: No rashes or lesions; Incisions C/D/I    LABS:                        10.4   8.70  )-----------( 137      ( 16 Jun 2022 07:05 )             29.3     06-16    138  |  103  |  10  ----------------------------<  123<H>  3.6   |  27  |  1.20    Ca    8.4      16 Jun 2022 07:05          CAPILLARY BLOOD GLUCOSE      POCT Blood Glucose.: 274 mg/dL (16 Jun 2022 11:13)      RADIOLOGY & ADDITIONAL STUDIES:    EKG:   Personally Reviewed:  [x] YES     Imaging:   Personally Reviewed:  [x] YES               [ ] Consultant(s) Notes Reviewed  [x] Care Discussed with Consultants/Other Providers:

## 2022-06-16 NOTE — CONSULT NOTE ADULT - PROBLEM SELECTOR RECOMMENDATION 3
A1C: 6.5, well controlled on oral meds at home: metformin and Januvia  Monitor FS qd, FS presently elevated in postop setting  consistent carb diet  C/w losartan to maintain goal SBP<130, premeals SSI TID  On DC can resume metformin and januvia however patient picked up 1 month supply of Januvia for $500. We discussed continuing just metformin after Januvia is completed as this is an expensive medication for him.   Can increase Metformin to 750mg BID once Januvia is complete and continue diet management  Patient to followup with his general practitioner

## 2022-06-16 NOTE — PROGRESS NOTE ADULT - ASSESSMENT
55 yo M s/p RALP, LND 6/15/2022    6/16: pain controlled, tolerating CLD, + flatus, urine yellow    Plan:  -f/u AM labs  -IVM  -reg diet  -BING Cr  -continue bactrim x 2 day  -f/u medicine  -pantoja/leg bag education  -DVT prophy, IS, OOB, ambulate

## 2022-06-16 NOTE — CONSULT NOTE ADULT - ASSESSMENT
57 y/o man PMH DM type 2,  malignant neoplasm of prostate presents for scheduled robotic assisted laparoscopic prostatectomy, bilateral lymph node dissection. Patient is now s/p RALP b/l PLND  on 6/15/22.

## 2022-06-16 NOTE — CONSULT NOTE ADULT - PROBLEM SELECTOR RECOMMENDATION 2
Hgb decreased to 10.4 from 14.0 in setting of recent surgery  Continue to monitor Hgb  Presently asymptomatic

## 2022-06-16 NOTE — CONSULT NOTE ADULT - PROBLEM SELECTOR PROBLEM 1
[facility] called [facility] to follow-up on IRF referrals sent last week.    [facility] (rep. Rachid) - patient is under review    [facility] - message left    Patient has pending [place] Medicaid and does not have LTAC or SNF benefits.    [facility] will continue to coordinate with patient, family, team and insurance to complete patient's discharge plan.       08/31/20 1506   Post-Acute Status   Post-Acute Authorization Placement   Post-Acute Placement Status Pending Post-Acute Medical Review   Discharge Plan   Discharge Plan A Rehab   Discharge Plan B Home Health     Jazzy Eid LMSW   - Case Management     Cancer of prostate

## 2022-06-16 NOTE — DISCHARGE NOTE NURSING/CASE MANAGEMENT/SOCIAL WORK - PATIENT PORTAL LINK FT
You can access the FollowMyHealth Patient Portal offered by [facility] by registering at the following website: [email or web address]. By joining [employer]’s FollowMyHealth portal, you will also be able to view your health information using other applications (apps) compatible with our system.

## 2022-06-16 NOTE — PROGRESS NOTE ADULT - SUBJECTIVE AND OBJECTIVE BOX
Overnight events:  None    Subjective:  Pt offers no complaints, pain controlled, tolerating CLD, noN/V, + flatus    Objective:    Vital signs  T(C): , Max: 37.2 (06-15-22 @ 16:29)  HR: 80 (06-16-22 @ 05:36)  BP: 115/65 (06-16-22 @ 05:36)  SpO2: 99% (06-16-22 @ 05:36)  Wt(kg): --    Output   Kit:1250  BING: 117  06-15 @ 07:01  -  06-16 @ 07:00  --------------------------------------------------------  IN: 625 mL / OUT: 2838.5 mL / NET: -2213.5 mL        Gen: NAD  Abd: port sites c/d/i, soft, nontender, nondistended  : kit secured    Labs: pending

## 2022-06-16 NOTE — DISCHARGE NOTE NURSING/CASE MANAGEMENT/SOCIAL WORK - NSDPDISTO_GEN_ALL_CORE
Pt  with incisions CDI steri strips to scope sites, voiding,  VS stable Afebrile. pt with positive bowel sounds sandrita po diet./[facility]

## 2022-06-16 NOTE — DISCHARGE NOTE NURSING/CASE MANAGEMENT/SOCIAL WORK - NSDCPEFALRISK_GEN_ALL_CORE
For information on Fall & Injury Prevention, visit: [email or web address] OR  [email or web address] OR  https://www.cdc.gov/steadi/patient.html

## 2022-06-17 PROBLEM — C61 MALIGNANT NEOPLASM OF PROSTATE: Chronic | Status: ACTIVE | Noted: 2022-06-13

## 2022-06-17 PROBLEM — I10 ESSENTIAL (PRIMARY) HYPERTENSION: Chronic | Status: ACTIVE | Noted: 2022-06-13

## 2022-06-17 PROBLEM — E11.9 TYPE 2 DIABETES MELLITUS WITHOUT COMPLICATIONS: Chronic | Status: ACTIVE | Noted: 2022-06-13

## 2022-06-18 ENCOUNTER — EMERGENCY (EMERGENCY)
Facility: HOSPITAL | Age: 57
LOS: 1 days | Discharge: HOME | End: 2022-06-18
Attending: EMERGENCY MEDICINE | Admitting: EMERGENCY MEDICINE
Payer: COMMERCIAL

## 2022-06-18 VITALS
TEMPERATURE: 97 F | SYSTOLIC BLOOD PRESSURE: 130 MMHG | RESPIRATION RATE: 18 BRPM | HEART RATE: 104 BPM | HEIGHT: 71.5 IN | DIASTOLIC BLOOD PRESSURE: 68 MMHG | OXYGEN SATURATION: 100 %

## 2022-06-18 DIAGNOSIS — Z98.890 OTHER SPECIFIED POSTPROCEDURAL STATES: Chronic | ICD-10-CM

## 2022-06-18 PROCEDURE — 99284 EMERGENCY DEPT VISIT MOD MDM: CPT

## 2022-06-18 RX ORDER — LIDOCAINE HCL 20 MG/ML
10 VIAL (ML) INJECTION ONCE
Refills: 0 | Status: DISCONTINUED | OUTPATIENT
Start: 2022-06-18 | End: 2022-06-22

## 2022-06-18 RX ORDER — OXYBUTYNIN CHLORIDE 5 MG
1 TABLET ORAL
Qty: 14 | Refills: 0
Start: 2022-06-18 | End: 2022-06-24

## 2022-06-18 RX ORDER — OXYBUTYNIN CHLORIDE 5 MG
5 TABLET ORAL ONCE
Refills: 0 | Status: COMPLETED | OUTPATIENT
Start: 2022-06-18 | End: 2022-06-18

## 2022-06-18 RX ADMIN — Medication 5 MILLIGRAM(S): at 18:06

## 2022-06-18 NOTE — ED PROVIDER NOTE - PATIENT PORTAL LINK FT
You can access the FollowMyHealth Patient Portal offered by [facility] by registering at the following website: [email or web address]. By joining [employer]’s FollowMyHealth portal, you will also be able to view your health information using other applications (apps) compatible with our system.

## 2022-06-18 NOTE — ED PROVIDER NOTE - CLINICAL SUMMARY MEDICAL DECISION MAKING FREE TEXT BOX
Jennifer: Recent prostatectomy for cancer. Leaking around 16 F Chávez. Small amount of hematuria. No infectious Sx. DDx: too-small of a Chávez, bladder spasm, UTI. Check UA/Cx. Replace Chávez.

## 2022-06-18 NOTE — ED PROVIDER NOTE - ATTENDING APP SHARED VISIT CONTRIBUTION OF CARE
I performed a face-to-face evaluation of the patient and performed a history and physical examination. I agree with the history and physical examination. If this was a PA visit, I personally saw the patient with the PA and performed a substantive portion of the visit including all aspects of the medical decision making.    Jennifer: Recent prostatectomy for cancer. Leaking around 16 F Chávez. Small amount of hematuria. No infectious Sx. DDx: too-small of a Chávez, bladder spasm, UTI. Check UA/Cx. Replace Chávez.

## 2022-06-18 NOTE — ED ADULT NURSE NOTE - OBJECTIVE STATEMENT
57 y/o M arrives to [facility] rm 18, states he had TURP on 6/15/22 and that urine is draining from around his urinary catheter. A&Ox4, ambulatory w/ assist, neg SOB, denies CP, endorses abd pain at surgical site, neg N/V/D, denies fevers. Catheter to leg bag noted to be draining yellow/clear urine. Pending uro consult. Call bell in reach.

## 2022-06-18 NOTE — ED PROVIDER NOTE - OBJECTIVE STATEMENT
Jennifer: Recent prostatectomy for cancer. Leaking around 16 F Chávez. Small amount of hematuria. No infectious Sx. No pain.

## 2022-06-18 NOTE — ED ADULT TRIAGE NOTE - CCCP TRG CHIEF CMPLNT
cathter is not draining properly since yesterday has had urine passing around the catheter with some blood/urinary catheter complications

## 2022-06-18 NOTE — ED PROVIDER NOTE - PHYSICAL EXAMINATION
Well appearing, well nourished, awake, alert, oriented to person, place, time/situation and in no apparent distress.    Airway patent    Eyes without scleral injection. No jaundice.    Strong pulse.    Respirations unlabored.    Abdomen soft, non-tender, no guarding. : small amount of dried blood at distal penis. Chávez coming out of penis. Remainder of penis and scrotum exam unremarkable.     Spine appears normal, range of motion is not limited, no muscle or joint tenderness.    Alert and oriented, no gross motor or sensory deficits.    Skin normal color for race, warm, dry and intact. No evidence of rash.    No SI/HI.

## 2022-06-18 NOTE — ED PROVIDER NOTE - NSFOLLOWUPINSTRUCTIONS_ED_ALL_ED_FT
Please call your urologist on Monday to follow up.  Empty pantoja bag as needed as instructed.  Keep hydrated.  No heavy lifting or straining for 4 to 6 weeks, avoid constipation. You may have intermittent pink tinged urine and small amounts of leakage around pantoja (due to bladder spasms).  This is normal. If your urine becomes bright red or with clots, or there is no urine in the bag, please call the office. You may shower.  Change dressing at drain site daily or as needed until dry.  Dr. Kiran's office will call you to schedule a follow up appointment next week for pantoja removal and further management.  Call the office if you have fever greater than 101, no urine in bag, pain not relieved with pain medication, nausea/vomiting.

## 2022-06-18 NOTE — ED PROVIDER NOTE - PROGRESS NOTE DETAILS
STAN Ross: Case discussed with urology. State it is normal to have leakage around pantoja site-cayden blood, urine, or blood tinged urine , pt is likely having bladder spasms, and may be given oxybutynin. Pantoja cannot be changed until at least a week out.  Discussed with pt. Advised to call urologist on Monday to arrange f/u.

## 2022-06-21 ENCOUNTER — NON-APPOINTMENT (OUTPATIENT)
Age: 57
End: 2022-06-21

## 2022-06-22 ENCOUNTER — APPOINTMENT (OUTPATIENT)
Dept: UROLOGY | Facility: CLINIC | Age: 57
End: 2022-06-22

## 2022-06-23 LAB — SURGICAL PATHOLOGY STUDY: SIGNIFICANT CHANGE UP

## 2022-06-24 ENCOUNTER — APPOINTMENT (OUTPATIENT)
Dept: UROLOGY | Facility: CLINIC | Age: 57
End: 2022-06-24

## 2022-06-24 VITALS
TEMPERATURE: 98 F | HEIGHT: 72 IN | SYSTOLIC BLOOD PRESSURE: 127 MMHG | HEART RATE: 73 BPM | DIASTOLIC BLOOD PRESSURE: 81 MMHG | OXYGEN SATURATION: 99 %

## 2022-06-24 PROCEDURE — 51700 IRRIGATION OF BLADDER: CPT

## 2022-06-24 PROCEDURE — 99024 POSTOP FOLLOW-UP VISIT: CPT | Mod: 25

## 2022-06-24 PROCEDURE — A4218: CPT | Mod: NC

## 2022-06-24 NOTE — HISTORY OF PRESENT ILLNESS
[FreeTextEntry1] : 55 y/o with prostate cancer \par Pre op: PSA 306ng/mL, [place] 4+4, Mian 4.\par Prostate diameters: 4.5x4.1x5.3cm.\par CT negative for Lymphadenopathy.\par Bone scan negative for metastases.

## 2022-06-24 NOTE — ASSESSMENT
[FreeTextEntry1] : 57 y/o with prostate cancer \par Pre op: PSA 306ng/mL, [place] 4+4, Mian 4.\par Prostate diameters: 4.5x4.1x5.3cm.\par CT negative for Lymphadenopathy.\par Bone scan negative for metastases. \par \par Pt came today for cath removal.\par Pathology report was discussed.\par Total LND removed: 24, all negative.\par pt3a N0. [place] 9 on final pathology,\par Margins : invasive carcinoma present at margins\par Seminal vescicles: negative\par AdditionaL findings: left anterior apex + right anterior base\par \par During the procedure titanium clips were applied on the base area to provide guidance for further localized radio therapy. \par \par The case will be submitted to tumor board.\par Scheduling new F/U visit in 1 month + PSA check.\par Ordered F/U with radiotherapist for localized treatment \par

## 2022-07-06 ENCOUNTER — NON-APPOINTMENT (OUTPATIENT)
Age: 57
End: 2022-07-06

## 2022-07-07 ENCOUNTER — NON-APPOINTMENT (OUTPATIENT)
Age: 57
End: 2022-07-07

## 2022-07-18 ENCOUNTER — APPOINTMENT (OUTPATIENT)
Dept: UROLOGY | Facility: CLINIC | Age: 57
End: 2022-07-18

## 2022-07-20 ENCOUNTER — APPOINTMENT (OUTPATIENT)
Dept: RADIATION ONCOLOGY | Facility: CLINIC | Age: 57
End: 2022-07-20

## 2022-07-25 PROBLEM — Z86.39 HISTORY OF HYPERLIPIDEMIA: Status: RESOLVED | Noted: 2022-07-25 | Resolved: 2022-07-25

## 2022-07-25 PROBLEM — Z86.79 HISTORY OF HYPERTENSION: Status: RESOLVED | Noted: 2022-07-25 | Resolved: 2022-07-25

## 2022-07-29 ENCOUNTER — APPOINTMENT (OUTPATIENT)
Dept: RADIATION ONCOLOGY | Facility: CLINIC | Age: 57
End: 2022-07-29

## 2022-07-29 VITALS
RESPIRATION RATE: 14 BRPM | DIASTOLIC BLOOD PRESSURE: 81 MMHG | HEART RATE: 70 BPM | SYSTOLIC BLOOD PRESSURE: 134 MMHG | OXYGEN SATURATION: 100 % | TEMPERATURE: 95.18 F

## 2022-07-29 DIAGNOSIS — Z86.39 PERSONAL HISTORY OF OTHER ENDOCRINE, NUTRITIONAL AND METABOLIC DISEASE: ICD-10-CM

## 2022-07-29 DIAGNOSIS — Z86.79 PERSONAL HISTORY OF OTHER DISEASES OF THE CIRCULATORY SYSTEM: ICD-10-CM

## 2022-07-29 PROCEDURE — 99205 OFFICE O/P NEW HI 60 MIN: CPT | Mod: 25

## 2022-07-29 RX ORDER — ATORVASTATIN CALCIUM 10 MG/1
10 TABLET, FILM COATED ORAL
Refills: 0 | Status: ACTIVE | COMMUNITY

## 2022-07-29 RX ORDER — LOSARTAN POTASSIUM 25 MG/1
25 TABLET, FILM COATED ORAL
Refills: 0 | Status: ACTIVE | COMMUNITY

## 2022-07-29 RX ORDER — FINASTERIDE 5 MG/1
5 TABLET, FILM COATED ORAL
Refills: 0 | Status: DISCONTINUED | COMMUNITY
End: 2022-07-29

## 2022-07-29 RX ORDER — DIAZEPAM 5 MG/1
5 TABLET ORAL
Qty: 2 | Refills: 0 | Status: DISCONTINUED | COMMUNITY
Start: 2022-03-25 | End: 2022-07-29

## 2022-07-29 NOTE — DISEASE MANAGEMENT
[Biopsy] : Patient had a biopsy on [>20] : >20 ng/mL [8] : Template Biopsy Faina Score: 8 [] : Patient had a Prostate MRI [5] : 5 [Extracapsular Extension] : Extracapsular extension [Radical Prostatectomy] : Radical Prostatectomy [Patient had a radical prostatectomy] : Patient had a radical prostatectomy  [9] : [place] Score 9 [Positive] : Positive margins [3] : T3 [a] : a [1] : N1 [BiopsyDate] : 3/28/22 [MeasuredProstateVolume] : 63 [TotalCores] : 12 [TotalPositiveCores] : 6 [MaxCoreInvolvement] : 100 [CTresults] : 4/2/22 - Prostate neoplasm.  No evidence of metastatic disease.   [BoneScanResults] : 4/4/22 - No scan evidence of osseous metastasis. Mild degenerative disease in the major joints.   [IIIB] : IIIB [RadicalProstatectomyDate] : 6/15/22 [TotalNumberofnodesresected] : 24 [PositiveNodes] : 2

## 2022-07-29 NOTE — PHYSICAL EXAM
[Normal] : oriented to person, place and time, the affect was normal, the mood was normal and not anxious [de-identified] : mildly swollen left lower leg edema [de-identified] : surgical scars healing well

## 2022-07-29 NOTE — REVIEW OF SYSTEMS
[Negative] : Allergic/Immunologic [IPSS Score (0-40): ___] : IPSS score: [unfilled] [EPIC-CP Score (0-60): ___] : EPIC-CP score: [unfilled] [Urinary Incontinence: Grade 0] : Urinary Incontinence: Grade 0  [Urinary Retention: Grade 0] : Urinary Retention: Grade 0 [Urinary Tract Pain: Grade 0] : Urinary Tract Pain: Grade 0 [Urinary Frequency: Grade 1 - Present] : Urinary Frequency: Grade 1 - Present [Erectile Dysfunction: Grade 3 - Decrease in erectile function (frequency/rigidity of erections) but erectile intervention not helpful (e.g., medication or mechanical devices such as penile pump); placement of a permanent penile prosthesis indicated (not p] : Erectile Dysfunction: Grade 3 - Decrease in erectile function (frequency/rigidity of erections) but erectile intervention not helpful (e.g., medication or mechanical devices such as penile pump); placement of a permanent penile prosthesis indicated (not previously present) [Ejaculation Disorder: Grade 2 - Anejaculation or retrograde ejaculation] : Ejaculation Disorder: Grade 2 - Anejaculation or retrograde ejaculation [FreeTextEntry3] : dribbling

## 2022-07-29 NOTE — HISTORY OF PRESENT ILLNESS
[FreeTextEntry1] : Mr. Huynh is a 56 year old male who is being seen in consultation to discuss radiation therapy. 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  Pre-op .0 ng/mL 3/25/22.\par \par HPI: \par 2/11/22 -  .70 ng/mL, done in [place].  Biopsy was recommended but patient was unable to get back to [place] at that time.   \par \par 3/25/22 - saw Dr. SEN Wong (urology) in consultation.  PSA repeated and biopsy recommended.  \par  ng/mL \par \par 3/28/22 - underwent Prostate Biopsy:  Adenocarcinoma of the Prostate, Faina score 4+4=8 in 6/12 cores, 100% max. involvement.  (Dr. Wong - urology)\par \par 4/2/22 - CT A/P:  Prostate is enlarged with enhancing mass in the right side of the prostate gland, measuring 4.5 x 4.1 x 5.3 cm.  No evidence of metastatic disease.  \par \par 4/4/22 - Bone Scan:  No scan evidence of osseous metastasis.  Mild degenerative disease in the major joints.  \par \par 5/20/22 - MRI of Prostate:  Large tumor nearly diffusely involves the right half of the prostate with gross extracapsular extension.  No evidence of seminal vesicle invasion, pelvic lymphadenopathy, or aggressive osseous lesions.  PIRADS 5\par \par 6/3/22 - PSMA PET scan:  1. Difficult to delineate markedly intense radiotracer uptake in approximately 75% of the enlarged prostate gland corresponding to known prostate cancer.  2. Subcentimeter right pelvic/internal iliac lymph nodes with increased radiotracer uptake, likely representing metastatic disease. 3. Nonspecific subtle sclerosis surrounding a small lucent lesion in the right posterior iliac bone with minimal radiotracer activity.4.  Subcentimeter right upper and right middle lobe nodules, either not PSMA-avid or too small to characterize. These are indeterminate.\par \par 6/15/22 - underwent Robotic assisted laparoscopic radical prostatectomy with super-extended lymphadenectomy with Dr. AYAAN Kiran (urology).   Faina 4+5=9, 2/24 lymph nodes positive, Positive margin.  Extraprostatic extension, urinary bladder neck invasion, lymphovascular invasion and perineural invasion all present.  \par \par 6/24/22 - saw Dr. Kiran (urologist) in follow up.  Referral for radiation therapy made.  \par \par 7/29/22 - presents in consultation to discuss radiation therapy, accompanied by his elder sister. C/O 1-2 nocturia, weak stream,  and dribbling, uses diaper. Erectile dysfunction post surgery. PSA sent today.\par \par \par

## 2022-07-29 NOTE — VITALS
[Maximal Pain Intensity: 0/10] : 0/10 [90: Able to carry normal activity; minor signs or symptoms of disease.] : 90: Able to carry normal activity; minor signs or symptoms of disease.  [Date: ____________] : Patient's last distress assessment performed on [unfilled]. [0 - No Distress] : Distress Level: 0 [ECOG Performance Status: 1 - Restricted in physically strenuous activity but ambulatory and able to carry out work of a light or sedentary nature] : Performance Status: 1 - Restricted in physically strenuous activity but ambulatory and able to carry out work of a light or sedentary nature, e.g., light house work, office work

## 2022-08-01 LAB — PSA SERPL-MCNC: 7.91 NG/ML

## 2022-08-10 ENCOUNTER — NON-APPOINTMENT (OUTPATIENT)
Age: 57
End: 2022-08-10

## 2022-08-16 ENCOUNTER — NON-APPOINTMENT (OUTPATIENT)
Age: 57
End: 2022-08-16

## 2022-09-06 ENCOUNTER — APPOINTMENT (OUTPATIENT)
Dept: NUCLEAR MEDICINE | Facility: CLINIC | Age: 57
End: 2022-09-06

## 2022-09-06 ENCOUNTER — OUTPATIENT (OUTPATIENT)
Dept: OUTPATIENT SERVICES | Facility: HOSPITAL | Age: 57
LOS: 1 days | End: 2022-09-06
Payer: SELF-PAY

## 2022-09-06 DIAGNOSIS — C61 MALIGNANT NEOPLASM OF PROSTATE: ICD-10-CM

## 2022-09-06 DIAGNOSIS — Z98.890 OTHER SPECIFIED POSTPROCEDURAL STATES: ICD-10-CM

## 2022-09-06 DIAGNOSIS — Z98.890 OTHER SPECIFIED POSTPROCEDURAL STATES: Chronic | ICD-10-CM

## 2022-09-06 PROCEDURE — A9595: CPT

## 2022-09-06 PROCEDURE — 78816 PET IMAGE W/CT FULL BODY: CPT

## 2022-09-06 PROCEDURE — 78816 PET IMAGE W/CT FULL BODY: CPT | Mod: 26

## 2022-09-08 ENCOUNTER — APPOINTMENT (OUTPATIENT)
Dept: GASTROENTEROLOGY | Facility: CLINIC | Age: 57
End: 2022-09-08

## 2022-09-08 ENCOUNTER — NON-APPOINTMENT (OUTPATIENT)
Age: 57
End: 2022-09-08

## 2022-09-08 ENCOUNTER — OUTPATIENT (OUTPATIENT)
Dept: OUTPATIENT SERVICES | Facility: HOSPITAL | Age: 57
LOS: 1 days | End: 2022-09-08

## 2022-09-08 VITALS
OXYGEN SATURATION: 98 % | HEIGHT: 72 IN | RESPIRATION RATE: 14 BRPM | WEIGHT: 188 LBS | DIASTOLIC BLOOD PRESSURE: 74 MMHG | HEART RATE: 77 BPM | BODY MASS INDEX: 25.47 KG/M2 | TEMPERATURE: 96.6 F | SYSTOLIC BLOOD PRESSURE: 114 MMHG

## 2022-09-08 DIAGNOSIS — Z78.9 OTHER SPECIFIED HEALTH STATUS: ICD-10-CM

## 2022-09-08 DIAGNOSIS — E11.9 TYPE 2 DIABETES MELLITUS W/OUT COMPLICATIONS: ICD-10-CM

## 2022-09-08 DIAGNOSIS — Z98.890 OTHER SPECIFIED POSTPROCEDURAL STATES: Chronic | ICD-10-CM

## 2022-09-08 DIAGNOSIS — Z12.11 ENCOUNTER FOR SCREENING FOR MALIGNANT NEOPLASM OF COLON: ICD-10-CM

## 2022-09-08 PROCEDURE — ZZZZZ: CPT

## 2022-09-13 ENCOUNTER — OUTPATIENT (OUTPATIENT)
Dept: OUTPATIENT SERVICES | Facility: HOSPITAL | Age: 57
LOS: 1 days | End: 2022-09-13
Payer: SELF-PAY

## 2022-09-13 ENCOUNTER — RESULT REVIEW (OUTPATIENT)
Age: 57
End: 2022-09-13

## 2022-09-13 ENCOUNTER — APPOINTMENT (OUTPATIENT)
Dept: NUCLEAR MEDICINE | Facility: CLINIC | Age: 57
End: 2022-09-13

## 2022-09-13 DIAGNOSIS — Z98.890 OTHER SPECIFIED POSTPROCEDURAL STATES: Chronic | ICD-10-CM

## 2022-09-13 DIAGNOSIS — C61 MALIGNANT NEOPLASM OF PROSTATE: ICD-10-CM

## 2022-09-13 PROCEDURE — 78816 PET IMAGE W/CT FULL BODY: CPT | Mod: 26,NC

## 2022-09-13 PROCEDURE — 78816 PET IMAGE W/CT FULL BODY: CPT

## 2022-09-13 PROCEDURE — A9595: CPT

## 2022-09-15 DIAGNOSIS — Z12.11 ENCOUNTER FOR SCREENING FOR MALIGNANT NEOPLASM OF COLON: ICD-10-CM

## 2022-09-16 ENCOUNTER — TRANSCRIPTION ENCOUNTER (OUTPATIENT)
Age: 57
End: 2022-09-16

## 2022-09-19 PROBLEM — Z12.11 COLON CANCER SCREENING: Status: ACTIVE | Noted: 2022-09-08

## 2022-09-19 NOTE — ASSESSMENT
[FreeTextEntry1] : 57 yrs old male with hx of prostate cancer s/p prostectomy and diabetes mellitus who presented for colon cancer screening. Had no previous colonoscopy. No family Hx of colon cancer. Will proceed with screening colonoscopy. \par Discussed the risks/benefits of the procedure during our clinic visit. Will do bowel prep with miralax and bisacodyl tablets. \par \par Recommendations: \par - will order screening colonoscopy\par - prep with miralax and  bisacodyl prior to the procedure\par - will need to be on clear liquid diet prior to the day of the procedure. \par \par This case was discussed with Dr. Yee who agrees with the above assessment and plan.

## 2022-09-19 NOTE — HISTORY OF PRESENT ILLNESS
[de-identified] : Mr. Huynh is a 57 yrs old male with Hx of DM, HLD, and prostate cancer s/p prostectomy in 6/22 who presented for colon cancer screening. Patient was recently dx with prostate cancer and had prostectomy in 6/15/22. He lives in [place] but comes to [place] for treatment. He had a PET scan and they are considering possible radiation for the prostate cancer. He never had colonoscopy in the past. He denied family history of colon cancer, liver cancer and adenomatous polyps. He denied abd pain, nausea, vomiting, diarrhea, constipation, bloody BMs, fevers and chills. Reported weight loss of 20 lbs over the past 1 month, attributed it to change in diet because he is moving back and forth between  and [place]. Has formed bowel movements every day. Has been tolerating diet well.

## 2022-09-19 NOTE — REVIEW OF SYSTEMS
[Fever] : no fever [Chills] : no chills [Chest Pain] : no chest pain [Palpitations] : no palpitations [Shortness Of Breath] : no shortness of breath [Cough] : no cough [Abdominal Pain] : no abdominal pain [Vomiting] : no vomiting [Constipation] : no constipation [Diarrhea] : no diarrhea [Melena] : no melena [Dysuria] : no dysuria [Incontinence] : no incontinence [Limb Swelling] : no limb swelling

## 2022-09-19 NOTE — END OF VISIT
[FreeTextEntry3] : Patient seen and examined. Agree with above. 58 yo w hx of prostate CA presenting for colon ca screening evaluation. No prior colonoscopy. No FH colon CA or colon polyps. Risks/benefits explained. Questions answered.

## 2022-09-20 ENCOUNTER — APPOINTMENT (OUTPATIENT)
Dept: UROLOGY | Facility: CLINIC | Age: 57
End: 2022-09-20

## 2022-09-20 DIAGNOSIS — Z98.890 OTHER SPECIFIED POSTPROCEDURAL STATES: ICD-10-CM

## 2022-09-20 PROCEDURE — 99213 OFFICE O/P EST LOW 20 MIN: CPT

## 2022-09-20 NOTE — HISTORY OF PRESENT ILLNESS
[FreeTextEntry1] : 57 y/o with prostate cancer \par Pre op: PSA 306ng/mL, [place] 4+4, Mian 4.\par Prostate diameters: 4.5x4.1x5.3cm.\par CT negative for Lymphadenopathy.\par Bone scan negative for metastases.

## 2022-09-20 NOTE — ASSESSMENT
[FreeTextEntry1] : 57 y/o with prostate cancer \par Pre op: PSA 306ng/mL, [place] 4+4, Mian 4.\par Prostate diameters: 4.5x4.1x5.3cm.\par CT negative for Lymphadenopathy.\par Bone scan negative for metastases. \par \par Pt came today for cath removal.\par Pathology report was discussed.\par Total LND removed: 24, all negative.\par pt3a N0. [place] 9 on final pathology,\par Margins : invasive carcinoma present at margins\par Seminal vescicles: negative\par AdditionaL findings: left anterior apex + right anterior base\par \par During the procedure titanium clips were applied on the base area to provide guidance for further localized radio therapy. \par \par Today the patient refers improvements in continence.\par He is still using 3 diapers per day, but refers regular urination.\par \par - Digito-rectal examination\par Prostatic loggia feels empty, with evidence of clips on the right base side.\par \par Performing blood draw for PSA.\par Tomorrow will refer to radiation oncologist for further treatment integration.\par \par \par \par 
no

## 2022-09-21 ENCOUNTER — OUTPATIENT (OUTPATIENT)
Dept: OUTPATIENT SERVICES | Facility: HOSPITAL | Age: 57
LOS: 1 days | Discharge: HOME | End: 2022-09-21

## 2022-09-21 ENCOUNTER — APPOINTMENT (OUTPATIENT)
Dept: RADIATION ONCOLOGY | Facility: CLINIC | Age: 57
End: 2022-09-21

## 2022-09-21 VITALS
WEIGHT: 192.9 LBS | OXYGEN SATURATION: 100 % | RESPIRATION RATE: 14 BRPM | BODY MASS INDEX: 26.16 KG/M2 | HEART RATE: 65 BPM | TEMPERATURE: 97.16 F | DIASTOLIC BLOOD PRESSURE: 82 MMHG | SYSTOLIC BLOOD PRESSURE: 142 MMHG

## 2022-09-21 DIAGNOSIS — Z98.890 OTHER SPECIFIED POSTPROCEDURAL STATES: Chronic | ICD-10-CM

## 2022-09-21 PROCEDURE — 99214 OFFICE O/P EST MOD 30 MIN: CPT

## 2022-09-21 PROCEDURE — 77263 THER RADIOLOGY TX PLNG CPLX: CPT

## 2022-09-21 RX ORDER — LEUPROLIDE ACETATE 45 MG/.375ML
45 INJECTION, SUSPENSION, EXTENDED RELEASE SUBCUTANEOUS ONCE
Qty: 1 | Refills: 0 | Status: DISCONTINUED | COMMUNITY
Start: 2022-09-21 | End: 2022-09-21

## 2022-09-21 RX ORDER — LEUPROLIDE ACETATE 45 MG/.375ML
45 INJECTION, SUSPENSION, EXTENDED RELEASE SUBCUTANEOUS
Qty: 1 | Refills: 0 | Status: ACTIVE | COMMUNITY
Start: 2022-09-21 | End: 1900-01-01

## 2022-09-21 NOTE — PHYSICAL EXAM
[Normal] : normoactive bowel sounds, soft and nontender, no hepatosplenomegaly or masses appreciated [de-identified] : surgical scars, no tenderness

## 2022-09-21 NOTE — HISTORY OF PRESENT ILLNESS
[FreeTextEntry1] : Mr. Huynh is a 57 year old male who is being seen in follow up visit. 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  Pre-op .0 ng/mL 3/25/22.\par \par PSA Trend:\par 2/11/22 - 291.70 ng/mL\par 3/25/22 - 306.00\par 7/29/22 - 7.91\par 9/16/2022 9.52 \par \par HPI: \par 2/11/22 -  .70 ng/mL, done in [place].  Biopsy was recommended but patient was unable to get back to [place] at that time.   \par \par 3/25/22 - saw Dr. SEN Wong (urology) in consultation.  PSA repeated and biopsy recommended.  \par  ng/mL \par \par 3/28/22 - underwent Prostate Biopsy:  Adenocarcinoma of the Prostate, Faina score 4+4=8 in 6/12 cores, 100% max. involvement.  (Dr. Wong - urology)\par \par 4/2/22 - CT A/P:  Prostate is enlarged with enhancing mass in the right side of the prostate gland, measuring 4.5 x 4.1 x 5.3 cm.  No evidence of metastatic disease.  \par \par 4/4/22 - Bone Scan:  No scan evidence of osseous metastasis.  Mild degenerative disease in the major joints.  \par \par 5/20/22 - MRI of Prostate:  Large tumor nearly diffusely involves the right half of the prostate with gross extracapsular extension.  No evidence of seminal vesicle invasion, pelvic lymphadenopathy, or aggressive osseous lesions.  PIRADS 5\par \par 6/3/22 - PSMA PET scan:  1. Difficult to delineate markedly intense radiotracer uptake in approximately 75% of the enlarged prostate gland corresponding to known prostate cancer.  2. Subcentimeter right pelvic/internal iliac lymph nodes with increased radiotracer uptake, likely representing metastatic disease. 3. Nonspecific subtle sclerosis surrounding a small lucent lesion in the right posterior iliac bone with minimal radiotracer activity.4.  Subcentimeter right upper and right middle lobe nodules, either not PSMA-avid or too small to characterize. These are indeterminate.\par \par 6/15/22 - underwent Robotic assisted laparoscopic radical prostatectomy with super-extended lymphadenectomy with Dr. AYAAN Kiran (urology).   Faina 4+5=9, 2/24 lymph nodes positive, Positive margin.  Extraprostatic extension, urinary bladder neck invasion, lymphovascular invasion and perineural invasion all present.  \par \par 6/24/22 - saw Dr. Kiran (urologist) in follow up.  Referral for radiation therapy made.  \par \par 7/29/22 - presents in consultation to discuss radiation therapy, accompanied by his elder sister. C/O 1-2 nocturia, weak stream,  and dribbling, uses diaper. Erectile dysfunction post surgery. PSA sent today.\par \par 9/6/22 - PSMA PET Scan: 1. Study is technically suboptimal due to infiltration of radiopharmaceutical. The study will be repeated at no additional charge. Repeat study is scheduled for 9/12/2022.  2. Status post interval prostatectomy. No evidence of PSMA-positive disease in prostate bed.  3. PSMA-positive right internal iliac lymph node, minimally increased in size since 6/1/2022, is compatible with metastasis. Previously seen PSMA-positive subcentimeter right pelvic lymph node has been resected.    4. A nonavid subcentimeter pulmonary nodule in the superior segment of the right lower lobe (image 145), and a 0.5 cm nodule in the peripheral right middle lobe (image 151), are unchanged, allowing for differences in CT technique.\par \par \par 9/12/22 - Repeat PSMA PET scan:  IMPRESSION:  Compared to whole body PSMA-PET/CT scan dated 6/1/2022:\par \par 1. Status post interval prostatectomy. Minimal avidity adjacent to surgical clip in the right prostatectomy bed, probably postsurgical.\par \par 2. PSMA-positive right internal iliac lymph node, minimally increased in size since 6/1/2022, is compatible with metastasis. Previously seen PSMA-positive subcentimeter right pelvic lymph node has been resected.\par \par 3. A non-FDG avid subcentimeter pulmonary nodule in superior segment of right lower lobe and 0.5 cm nodule in the peripheral right middle lobe are unchanged and remain indeterminate.\par \par 4. Interval resolution of minimal FDG avidity of the unchanged subtle sclerosis surrounding a small lucent lesion in the right posterior iliac bone\par \par \par 9/21/22 - presents for follow up visit.  Using 3 dipers per day. No erections. Feels he has some bilateral leg edema.\par \par \par

## 2022-09-21 NOTE — DISEASE MANAGEMENT
[IIIB] : IIIB [>20] : >20 ng/mL [Biopsy] : Patient had a biopsy on [8] : Template Biopsy Faina Score: 8 [] : Patient had a Prostate MRI [5] : 5 [Extracapsular Extension] : Extracapsular extension [Radical Prostatectomy] : Radical Prostatectomy [Patient had a radical prostatectomy] : Patient had a radical prostatectomy  [9] : [place] Score 9 [Positive] : Positive margins [3] : T3 [a] : a [1] : N1 [Pathological] : TNM Stage: p [TTNM] : 3a [NTNM] : 1 [MTNM] : 0 [BiopsyDate] : 3/28/22 [MeasuredProstateVolume] : 63 [TotalCores] : 12 [TotalPositiveCores] : 6 [MaxCoreInvolvement] : 100 [CTresults] : 4/2/22 - Prostate neoplasm.  No evidence of metastatic disease.   [BoneScanResults] : 4/4/22 - No scan evidence of osseous metastasis. Mild degenerative disease in the major joints.   [RadicalProstatectomyDate] : 6/15/22 [TotalNumberofnodesresected] : 24 [PositiveNodes] : 2

## 2022-09-23 ENCOUNTER — NON-APPOINTMENT (OUTPATIENT)
Age: 57
End: 2022-09-23

## 2022-09-23 LAB — PSA SERPL-MCNC: 9.52 NG/ML

## 2022-09-23 PROCEDURE — 77332 RADIATION TREATMENT AID(S): CPT | Mod: 26

## 2022-09-28 ENCOUNTER — APPOINTMENT (OUTPATIENT)
Dept: HEMATOLOGY ONCOLOGY | Facility: CLINIC | Age: 57
End: 2022-09-28

## 2022-09-30 PROCEDURE — 77338 DESIGN MLC DEVICE FOR IMRT: CPT | Mod: 26

## 2022-09-30 PROCEDURE — 77300 RADIATION THERAPY DOSE PLAN: CPT | Mod: 26

## 2022-09-30 PROCEDURE — 77301 RADIOTHERAPY DOSE PLAN IMRT: CPT | Mod: 26

## 2022-10-06 PROCEDURE — 77387B: CUSTOM | Mod: 26

## 2022-10-07 PROCEDURE — 77387B: CUSTOM | Mod: 26

## 2022-10-10 DIAGNOSIS — Z92.3 PERSONAL HISTORY OF IRRADIATION: ICD-10-CM

## 2022-10-10 PROCEDURE — 77387B: CUSTOM | Mod: 26

## 2022-10-10 NOTE — REVIEW OF SYSTEMS
Negative [Urinary Frequency: Grade 1 - Present] : Urinary Frequency: Grade 1 - Present [Erectile Dysfunction: Grade 2 - Decrease in erectile function (frequency/rigidity of erections), erectile intervention indicated, (e.g., medication or mechanical devices such as penile pump)] : Erectile Dysfunction: Grade 2 - Decrease in erectile function (frequency/rigidity of erections), erectile intervention indicated, (e.g., medication or mechanical devices such as penile pump) [Anal Pain: Grade 0] : Anal Pain: Grade 0 [Constipation: Grade 0] : Constipation: Grade 0 [Diarrhea: Grade 0] : Diarrhea: Grade 0 [Proctitis: Grade 0] : Proctitis: Grade 0 [Rectal Pain: Grade 0] : Rectal Pain: Grade 0 [Hematuria: Grade 0] : Hematuria: Grade 0 [Urinary Retention: Grade 0] : Urinary Retention: Grade 0 [Urinary Tract Pain: Grade 0] : Urinary Tract Pain: Grade 0

## 2022-10-10 NOTE — HISTORY OF PRESENT ILLNESS
[FreeTextEntry1] : Mr. Huynh is a 57 year old male who is being seen for an on treatment visit.  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  Pre-op .0 ng/mL 3/25/22.\par \par PSA Trend:\par 2/11/22 -   291.70 ng/mL\par 3/25/22 -  306.00\par 7/29/22 -  7.91\par 9/16/22  -  9.52 \par \par HPI: \par 2/11/22 -  .70 ng/mL, done in [place].  Biopsy was recommended but [place].   \par \par 3/25/22 - saw Dr. SEN Wong (urology) in consultation.  PSA repeated and biopsy recommended.  \par  ng/mL \par \par 3/28/22 - underwent Prostate Biopsy:  Adenocarcinoma of the Prostate, [place] score 4+4=8 in 6/12 cores, 100% max. involvement.  (Dr. Wong - urology)\par \par 4/2/22 - CT A/P:  Prostate is enlarged with enhancing mass in the right side of the prostate gland, measuring 4.5 x 4.1 x 5.3 cm.  No evidence of metastatic disease.  \par \par 4/4/22 - Bone Scan:  No scan evidence of osseous metastasis.  Mild degenerative disease in the major joints.  \par \par 5/20/22 - MRI of Prostate:  Large tumor nearly diffusely involves the right half of the prostate with gross extracapsular extension.  No evidence of seminal vesicle invasion, pelvic lymphadenopathy, or aggressive osseous lesions.  PIRADS 5\par \par 6/3/22 - PSMA PET scan:  1. Difficult to delineate markedly intense radiotracer uptake in approximately 75% of the enlarged prostate gland corresponding to known prostate cancer.  2. Subcentimeter right pelvic/internal iliac lymph nodes with increased radiotracer uptake, likely representing metastatic disease. 3. Nonspecific subtle sclerosis surrounding a small lucent lesion in the right posterior iliac bone with minimal radiotracer activity.4.  Subcentimeter right upper and right middle lobe nodules, either not PSMA-avid or too small to characterize. These are indeterminate.\par \par 6/15/22 - underwent Robotic assisted laparoscopic radical prostatectomy with super-extended lymphadenectomy with Dr. AYAAN Kiran (urology).   [place] 4+5=9, 2/24 lymph nodes positive, Positive margin.  Extraprostatic extension, urinary bladder neck invasion, lymphovascular invasion and perineural invasion all present.  \par \par 6/24/22 - saw Dr. Kiran (urologist) in follow up.  Referral for radiation therapy made.  \par \par 7/29/22 - presents in consultation to discuss radiation therapy, accompanied by his elder sister. C/O 1-2 nocturia, weak stream,  and dribbling, uses diaper. Erectile dysfunction post surgery. PSA sent today.\par \par 9/6/22 - PSMA PET Scan: 1. Study is technically suboptimal due to infiltration of radiopharmaceutical. The study will be repeated at no additional charge. Repeat study is scheduled for 9/12/2022.  2. Status post interval prostatectomy. No evidence of PSMA-positive disease in prostate bed.  3. PSMA-positive right internal iliac lymph node, minimally increased in size since 6/1/2022, is compatible with metastasis. Previously seen PSMA-positive subcentimeter right pelvic lymph node has been resected.    4. A nonavid subcentimeter pulmonary nodule in the superior segment of the right lower lobe (image 145), and a 0.5 cm nodule in the peripheral right middle lobe (image 151), are unchanged, allowing for differences in CT technique.\par \par \par 9/12/22 - Repeat PSMA PET scan:  IMPRESSION:  Compared to whole body PSMA-PET/CT scan dated 6/1/2022:\par 1. Status post interval prostatectomy. Minimal avidity adjacent to surgical clip in the right prostatectomy bed, probably postsurgical.\par 2. PSMA-positive right internal iliac lymph node, minimally increased in size since 6/1/2022, is compatible with metastasis. Previously seen PSMA-positive subcentimeter right pelvic lymph node has been resected.\par 3. A non-FDG avid subcentimeter pulmonary nodule in superior segment of right lower lobe and 0.5 cm nodule in the peripheral right middle lobe are unchanged and remain indeterminate.\par 4. Interval resolution of minimal FDG avidity of the unchanged subtle sclerosis surrounding a small lucent lesion in the right posterior iliac bone\par \par \par 9/21/22 - presented for follow up visit.  Using 3 diapers per day. No erections. Feels he has some bilateral leg edema.  Started on the Bicalutamide after the PSMA PET scan last week.   \par \par 10/6/22 - started on RADIATION Therapy to Prostate Bed/Nodes, 6250 cGy in 25 fx planned \par \par 10/10/22 -  OTV 3/25 fx completed.  Patient feeling feel, reports decreased urinary leakage, requiring 2 diapers/day.  Patient obtained Eligard injection while in [place], he does not recall the date.  He will look it up and let us know.  Denies nocturia or weak stream, no issues with bowels.\par \par \par

## 2022-10-10 NOTE — DISEASE MANAGEMENT
[Pathological] : TNM Stage: p [IIIB] : IIIB [>20] : >20 ng/mL [Biopsy] : Patient had a biopsy on [8] : Template Biopsy Faina Score: 8 [] : Patient had a Prostate MRI [5] : 5 [Extracapsular Extension] : Extracapsular extension [Radical Prostatectomy] : Radical Prostatectomy [Patient had a radical prostatectomy] : Patient had a radical prostatectomy  [9] : [place] Score 9 [Positive] : Positive margins [3] : T3 [a] : a [1] : N1 [TTNM] : 3a [NTNM] : 1 [MTNM] : 0 [de-identified] : 750cGy [de-identified] : [id number] [de-identified] : Prostate Bed/Nodes  [BiopsyDate] : 3/28/22 [MeasuredProstateVolume] : 63 [TotalCores] : 12 [TotalPositiveCores] : 6 [MaxCoreInvolvement] : 100 [CTresults] : 4/2/22 - Prostate neoplasm.  No evidence of metastatic disease.   [BoneScanResults] : 4/4/22 - No scan evidence of osseous metastasis. Mild degenerative disease in the major joints.   [RadicalProstatectomyDate] : 6/15/22 [TotalNumberofnodesresected] : 24 [PositiveNodes] : 2

## 2022-10-11 PROCEDURE — 77387B: CUSTOM | Mod: 26

## 2022-10-12 PROCEDURE — 77427 RADIATION TX MANAGEMENT X5: CPT

## 2022-10-12 PROCEDURE — 77014: CPT | Mod: 26

## 2022-10-13 PROCEDURE — 77387B: CUSTOM | Mod: 26

## 2022-10-14 PROCEDURE — 77387B: CUSTOM | Mod: 26

## 2022-10-17 ENCOUNTER — NON-APPOINTMENT (OUTPATIENT)
Age: 57
End: 2022-10-17

## 2022-10-17 PROCEDURE — 77387B: CUSTOM | Mod: 26

## 2022-10-17 NOTE — REVIEW OF SYSTEMS
[Anal Pain: Grade 0] : Anal Pain: Grade 0 [Constipation: Grade 0] : Constipation: Grade 0 [Diarrhea: Grade 0] : Diarrhea: Grade 0 [Proctitis: Grade 0] : Proctitis: Grade 0 [Rectal Pain: Grade 0] : Rectal Pain: Grade 0 [Hematuria: Grade 0] : Hematuria: Grade 0 [Urinary Retention: Grade 0] : Urinary Retention: Grade 0 [Urinary Tract Pain: Grade 0] : Urinary Tract Pain: Grade 0 [Urinary Frequency: Grade 1 - Present] : Urinary Frequency: Grade 1 - Present [Erectile Dysfunction: Grade 2 - Decrease in erectile function (frequency/rigidity of erections), erectile intervention indicated, (e.g., medication or mechanical devices such as penile pump)] : Erectile Dysfunction: Grade 2 - Decrease in erectile function (frequency/rigidity of erections), erectile intervention indicated, (e.g., medication or mechanical devices such as penile pump) [Urinary Urgency: Grade 1 - Present] : Urinary Urgency: Grade 1 - Present [Urinary Incontinence: Grade 1 - Occasional (e.g., with coughing, sneezing, etc.), pads not indicated] : Urinary Incontinence: Grade 1 - Occasional (e.g., with coughing, sneezing, etc.), pads not indicated

## 2022-10-17 NOTE — HISTORY OF PRESENT ILLNESS
[FreeTextEntry1] : Mr. Huynh is a 57 year old male who is being seen for an on treatment visit.  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  Pre-op .0 ng/mL 3/25/22.\par \par PSA Trend:\par 2/11/22 -   291.70 ng/mL\par 3/25/22 -  306.00\par 7/29/22 -  7.91\par 9/16/22  -  9.52 \par \par HPI: \par 2/11/22 -  .70 ng/mL, done in [place].  Biopsy was recommended but [place] was [place].   \par \par 3/25/22 - saw Dr. SEN Wong (urology) in consultation.  PSA repeated and biopsy recommended.  \par  ng/mL \par \par 3/28/22 - underwent Prostate Biopsy:  Adenocarcinoma of the Prostate, [place] score 4+4=8 in 6/12 cores, 100% max. involvement.  (Dr. Wong - urology)\par \par 4/2/22 - CT A/P:  Prostate is enlarged with enhancing mass in the right side of the prostate gland, measuring 4.5 x 4.1 x 5.3 cm.  No evidence of metastatic disease.  \par \par 4/4/22 - Bone Scan:  No scan evidence of osseous metastasis.  Mild degenerative disease in the major joints.  \par \par 5/20/22 - MRI of Prostate:  Large tumor nearly diffusely involves the right half of the prostate with gross extracapsular extension.  No evidence of seminal vesicle invasion, pelvic lymphadenopathy, or aggressive osseous lesions.  PIRADS 5\par \par 6/3/22 - PSMA PET scan:  1. Difficult to delineate markedly intense radiotracer uptake in approximately 75% of the enlarged prostate gland corresponding to known prostate cancer.  2. Subcentimeter right pelvic/internal iliac lymph nodes with increased radiotracer uptake, likely representing metastatic disease. 3. Nonspecific subtle sclerosis surrounding a small lucent lesion in the right posterior iliac bone with minimal radiotracer activity.4.  Subcentimeter right upper and right middle lobe nodules, either not PSMA-avid or too small to characterize. These are indeterminate.\par \par 6/15/22 - underwent Robotic assisted laparoscopic radical prostatectomy with super-extended lymphadenectomy with Dr. AYAAN Kiran (urology).   [place] 4+5=9, 2/24 lymph nodes positive, Positive margin.  Extraprostatic extension, urinary bladder neck invasion, lymphovascular invasion and perineural invasion all present.  \par \par 6/24/22 - saw Dr. Kiran (urologist) in follow up.  Referral for radiation therapy made.  \par \par 7/29/22 - presents in consultation to discuss radiation therapy, accompanied by his elder sister. C/O 1-2 nocturia, weak stream,  and dribbling, uses diaper. Erectile dysfunction post surgery. PSA sent today.\par \par 9/6/22 - PSMA PET Scan: 1. Study is technically suboptimal due to infiltration of radiopharmaceutical. The study will be repeated at no additional charge. Repeat study is scheduled for 9/12/2022.  2. Status post interval prostatectomy. No evidence of PSMA-positive disease in prostate bed.  3. PSMA-positive right internal iliac lymph node, minimally increased in size since 6/1/2022, is compatible with metastasis. Previously seen PSMA-positive subcentimeter right pelvic lymph node has been resected.    4. A nonavid subcentimeter pulmonary nodule in the superior segment of the right lower lobe (image 145), and a 0.5 cm nodule in the peripheral right middle lobe (image 151), are unchanged, allowing for differences in CT technique.\par \par \par 9/12/22 - Repeat PSMA PET scan:  IMPRESSION:  Compared to whole body PSMA-PET/CT scan dated 6/1/2022:\par 1. Status post interval prostatectomy. Minimal avidity adjacent to surgical clip in the right prostatectomy bed, probably postsurgical.\par 2. PSMA-positive right internal iliac lymph node, minimally increased in size since 6/1/2022, is compatible with metastasis. Previously seen PSMA-positive subcentimeter right pelvic lymph node has been resected.\par 3. A non-FDG avid subcentimeter pulmonary nodule in superior segment of right lower lobe and 0.5 cm nodule in the peripheral right middle lobe are unchanged and remain indeterminate.\par 4. Interval resolution of minimal FDG avidity of the unchanged subtle sclerosis surrounding a small lucent lesion in the right posterior iliac bone\par \par \par 9/21/22 - presented for follow up visit.  Using 3 diapers per day. No erections. Feels he has some bilateral leg edema.  Started on the Bicalutamide after the PSMA PET scan last week.   \par Received Eligard injection in [place].  \par \par 10/6/22 - started on RADIATION Therapy to Prostate Bed/Nodes, 6250 cGy in 25 fx planned \par \par 10/10/22 -  OTV 3/25 fx completed.  Patient feeling feel, reports decreased urinary leakage, requiring 2 diapers/day.  Patient obtained Eligard injection while in [place], he does not recall the date.  He will look it up and let us know.  Denies nocturia or weak stream, no issues with bowels.\par \par 10/17/22 - OTV 7/25 fx completed. feeling well overall. notes some increased frequency and urgency. no bowel trouble. no pain or irritation.\par

## 2022-10-18 PROCEDURE — 77387B: CUSTOM | Mod: 26

## 2022-10-19 PROCEDURE — 77427 RADIATION TX MANAGEMENT X5: CPT

## 2022-10-19 PROCEDURE — 77014: CPT | Mod: 26

## 2022-10-20 PROCEDURE — 77387B: CUSTOM | Mod: 26

## 2022-10-21 PROCEDURE — 77387B: CUSTOM | Mod: 26

## 2022-10-24 PROCEDURE — 77387B: CUSTOM | Mod: 26

## 2022-10-25 PROCEDURE — 77387B: CUSTOM | Mod: 26

## 2022-10-26 ENCOUNTER — NON-APPOINTMENT (OUTPATIENT)
Age: 57
End: 2022-10-26

## 2022-10-26 PROCEDURE — 77014: CPT | Mod: 26

## 2022-10-26 PROCEDURE — 77427 RADIATION TX MANAGEMENT X5: CPT

## 2022-10-26 NOTE — DISEASE MANAGEMENT
[Pathological] : TNM Stage: p [IIIB] : IIIB [>20] : >20 ng/mL [Biopsy] : Patient had a biopsy on [8] : Template Biopsy Faina Score: 8 [] : Patient had a Prostate MRI [5] : 5 [Extracapsular Extension] : Extracapsular extension [Radical Prostatectomy] : Radical Prostatectomy [Patient had a radical prostatectomy] : Patient had a radical prostatectomy  [9] : [place] Score 9 [Positive] : Positive margins [3] : T3 [a] : a [1] : N1 [Treatment with androgen ablation] : Treatment with androgen ablation [TTNM] : 3a [NTNM] : 1 [MTNM] : 0 [de-identified] : [id number] [de-identified] : [id number] [de-identified] : Prostate Bed/Nodes  [BiopsyDate] : 3/28/22 [MeasuredProstateVolume] : 63 [TotalCores] : 12 [TotalPositiveCores] : 6 [MaxCoreInvolvement] : 100 [CTresults] : 4/2/22 - Prostate neoplasm.  No evidence of metastatic disease.   [BoneScanResults] : 4/4/22 - No scan evidence of osseous metastasis. Mild degenerative disease in the major joints.   [RadicalProstatectomyDate] : 6/15/22 [TotalNumberofnodesresected] : 24 [PositiveNodes] : 2 [ADTStartedDate] : 9/2022

## 2022-10-26 NOTE — REVIEW OF SYSTEMS
[Anal Pain: Grade 0] : Anal Pain: Grade 0 [Constipation: Grade 0] : Constipation: Grade 0 [Diarrhea: Grade 0] : Diarrhea: Grade 0 [Proctitis: Grade 0] : Proctitis: Grade 0 [Rectal Pain: Grade 0] : Rectal Pain: Grade 0 [Hematuria: Grade 0] : Hematuria: Grade 0 [Urinary Incontinence: Grade 1 - Occasional (e.g., with coughing, sneezing, etc.), pads not indicated] : Urinary Incontinence: Grade 1 - Occasional (e.g., with coughing, sneezing, etc.), pads not indicated [Urinary Retention: Grade 0] : Urinary Retention: Grade 0 [Urinary Urgency: Grade 1 - Present] : Urinary Urgency: Grade 1 - Present [Urinary Frequency: Grade 1 - Present] : Urinary Frequency: Grade 1 - Present [Erectile Dysfunction: Grade 2 - Decrease in erectile function (frequency/rigidity of erections), erectile intervention indicated, (e.g., medication or mechanical devices such as penile pump)] : Erectile Dysfunction: Grade 2 - Decrease in erectile function (frequency/rigidity of erections), erectile intervention indicated, (e.g., medication or mechanical devices such as penile pump) [Urinary Tract Pain: Grade 1 - Mild pain] : Urinary Tract Pain: Grade 1 - Mild pain

## 2022-10-26 NOTE — HISTORY OF PRESENT ILLNESS
[FreeTextEntry1] : Mr. Huynh is a 57 year old male who is being seen for an on treatment visit.  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  Pre-op .0 ng/mL 3/25/22.\par \par PSA Trend:\par 2/11/22 -   291.70 ng/mL\par 3/25/22 -  306.00\par 7/29/22 -  7.91\par 9/16/22  -  9.52 \par \par HPI: \par 2/11/22 -  .70 ng/mL, done in [place].  Biopsy was recommended but [place].   \par \par 3/25/22 - saw Dr. SEN Wong (urology) in consultation.  PSA repeated and biopsy recommended.  \par  ng/mL \par \par 3/28/22 - underwent Prostate Biopsy:  Adenocarcinoma of the Prostate, [place] score 4+4=8 in 6/12 cores, 100% max. involvement.  (Dr. Wong - urology)\par \par 4/2/22 - CT A/P:  Prostate is enlarged with enhancing mass in the right side of the prostate gland, measuring 4.5 x 4.1 x 5.3 cm.  No evidence of metastatic disease.  \par \par 4/4/22 - Bone Scan:  No scan evidence of osseous metastasis.  Mild degenerative disease in the major joints.  \par \par 5/20/22 - MRI of Prostate:  Large tumor nearly diffusely involves the right half of the prostate with gross extracapsular extension.  No evidence of seminal vesicle invasion, pelvic lymphadenopathy, or aggressive osseous lesions.  PIRADS 5\par \par 6/3/22 - PSMA PET scan:  1. Difficult to delineate markedly intense radiotracer uptake in approximately 75% of the enlarged prostate gland corresponding to known prostate cancer.  2. Subcentimeter right pelvic/internal iliac lymph nodes with increased radiotracer uptake, likely representing metastatic disease. 3. Nonspecific subtle sclerosis surrounding a small lucent lesion in the right posterior iliac bone with minimal radiotracer activity.4.  Subcentimeter right upper and right middle lobe nodules, either not PSMA-avid or too small to characterize. These are indeterminate.\par \par 6/15/22 - underwent Robotic assisted laparoscopic radical prostatectomy with super-extended lymphadenectomy with Dr. AYAAN Kiran (urology).   [place] 4+5=9, 2/24 lymph nodes positive, Positive margin.  Extraprostatic extension, urinary bladder neck invasion, lymphovascular invasion and perineural invasion all present.  \par \par 6/24/22 - saw Dr. Kiran (urologist) in follow up.  Referral for radiation therapy made.  \par \par 7/29/22 - presents in consultation to discuss radiation therapy, accompanied by his elder sister. C/O 1-2 nocturia, weak stream,  and dribbling, uses diaper. Erectile dysfunction post surgery. PSA sent today.\par \par 9/6/22 - PSMA PET Scan: 1. Study is technically suboptimal due to infiltration of radiopharmaceutical. The study will be repeated at no additional charge. Repeat study is scheduled for 9/12/2022.  2. Status post interval prostatectomy. No evidence of PSMA-positive disease in prostate bed.  3. PSMA-positive right internal iliac lymph node, minimally increased in size since 6/1/2022, is compatible with metastasis. Previously seen PSMA-positive subcentimeter right pelvic lymph node has been resected.    4. A nonavid subcentimeter pulmonary nodule in the superior segment of the right lower lobe (image 145), and a 0.5 cm nodule in the peripheral right middle lobe (image 151), are unchanged, allowing for differences in CT technique.\par \par \par 9/12/22 - Repeat PSMA PET scan:  IMPRESSION:  Compared to whole body PSMA-PET/CT scan dated 6/1/2022:\par 1. Status post interval prostatectomy. Minimal avidity adjacent to surgical clip in the right prostatectomy bed, probably postsurgical.\par 2. PSMA-positive right internal iliac lymph node, minimally increased in size since 6/1/2022, is compatible with metastasis. Previously seen PSMA-positive subcentimeter right pelvic lymph node has been resected.\par 3. A non-FDG avid subcentimeter pulmonary nodule in superior segment of right lower lobe and 0.5 cm nodule in the peripheral right middle lobe are unchanged and remain indeterminate.\par 4. Interval resolution of minimal FDG avidity of the unchanged subtle sclerosis surrounding a small lucent lesion in the right posterior iliac bone\par \par \par 9/21/22 - presented for follow up visit.  Using 3 diapers per day. No erections. Feels he has some bilateral leg edema.  Started on the Bicalutamide after the PSMA PET scan last week.   \par Received Eligard injection in [place].  \par \par 10/6/22 - started on RADIATION Therapy to Prostate Bed/Nodes, 6250 cGy in 25 fx planned \par \par 10/10/22 -  OTV 3/25 fx completed.  Patient feeling feel, reports decreased urinary leakage, requiring 2 diapers/day.  Patient obtained Eligard injection while in [place], he does not recall the date.  He will look it up and let us know.  Denies nocturia or weak stream, no issues with bowels.\par \par 10/17/22 - OTV 7/25 fx completed. feeling well overall. notes some increased frequency and urgency. no bowel trouble. no pain or irritation.\par \par 10/26/22 - OTV 15/25 fx completed.  \par

## 2022-10-27 PROCEDURE — 77387B: CUSTOM | Mod: 26

## 2022-10-28 PROCEDURE — 77387B: CUSTOM | Mod: 26

## 2022-10-30 PROCEDURE — 77387B: CUSTOM | Mod: 26

## 2022-10-31 ENCOUNTER — NON-APPOINTMENT (OUTPATIENT)
Age: 57
End: 2022-10-31

## 2022-10-31 PROCEDURE — 77387B: CUSTOM | Mod: 26

## 2022-10-31 NOTE — REVIEW OF SYSTEMS
[Anal Pain: Grade 0] : Anal Pain: Grade 0 [Constipation: Grade 0] : Constipation: Grade 0 [Diarrhea: Grade 0] : Diarrhea: Grade 0 [Proctitis: Grade 0] : Proctitis: Grade 0 [Rectal Pain: Grade 0] : Rectal Pain: Grade 0 [Hematuria: Grade 0] : Hematuria: Grade 0 [Urinary Incontinence: Grade 1 - Occasional (e.g., with coughing, sneezing, etc.), pads not indicated] : Urinary Incontinence: Grade 1 - Occasional (e.g., with coughing, sneezing, etc.), pads not indicated [Urinary Retention: Grade 0] : Urinary Retention: Grade 0 [Urinary Urgency: Grade 1 - Present] : Urinary Urgency: Grade 1 - Present [Urinary Frequency: Grade 1 - Present] : Urinary Frequency: Grade 1 - Present [Erectile Dysfunction: Grade 2 - Decrease in erectile function (frequency/rigidity of erections), erectile intervention indicated, (e.g., medication or mechanical devices such as penile pump)] : Erectile Dysfunction: Grade 2 - Decrease in erectile function (frequency/rigidity of erections), erectile intervention indicated, (e.g., medication or mechanical devices such as penile pump) [Urinary Tract Pain: Grade 0] : Urinary Tract Pain: Grade 0

## 2022-10-31 NOTE — DISEASE MANAGEMENT
[Pathological] : TNM Stage: p [IIIB] : IIIB [>20] : >20 ng/mL [Biopsy] : Patient had a biopsy on [8] : Template Biopsy Faina Score: 8 [] : Patient had a Prostate MRI [5] : 5 [Extracapsular Extension] : Extracapsular extension [Radical Prostatectomy] : Radical Prostatectomy [Patient had a radical prostatectomy] : Patient had a radical prostatectomy  [9] : [place] Score 9 [Positive] : Positive margins [3] : T3 [a] : a [1] : N1 [Treatment with androgen ablation] : Treatment with androgen ablation [TTNM] : 3a [NTNM] : 1 [MTNM] : 0 [de-identified] : [id number] [de-identified] : [id number] [de-identified] : Prostate Bed/Nodes  [BiopsyDate] : 3/28/22 [MeasuredProstateVolume] : 63 [TotalCores] : 12 [TotalPositiveCores] : 6 [MaxCoreInvolvement] : 100 [CTresults] : 4/2/22 - Prostate neoplasm.  No evidence of metastatic disease.   [BoneScanResults] : 4/4/22 - No scan evidence of osseous metastasis. Mild degenerative disease in the major joints.   [RadicalProstatectomyDate] : 6/15/22 [TotalNumberofnodesresected] : 24 [PositiveNodes] : 2 [ADTStartedDate] : 9/2022

## 2022-10-31 NOTE — HISTORY OF PRESENT ILLNESS
[FreeTextEntry1] : Mr. Huynh is a 57 year old male who is being seen for an on treatment visit.  He is unaccompanied for today's visit.  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  Pre-op .0 ng/mL 3/25/22.\par \par PSA Trend:\par 2/11/22 -   291.70 ng/mL\par 3/25/22 -  306.00\par 7/29/22 -  7.91\par 9/16/22  -  9.52 \par \par HPI: \par 2/11/22 -  .70 ng/mL, done in [place].  Biopsy was recommended but patient was unable to get [place] at that [place].   \par \par 3/25/22 - saw Dr. SEN Wong (urology) in consultation.  PSA repeated and biopsy recommended.  \par  ng/mL \par \par 3/28/22 - underwent Prostate Biopsy:  Adenocarcinoma of the Prostate, Faina score 4+4=8 in 6/12 cores, 100% max. involvement.  (Dr. Wong - urology)\par \par 4/2/22 - CT A/P:  Prostate is enlarged with enhancing mass in the right side of the prostate gland, measuring 4.5 x 4.1 x 5.3 cm.  No evidence of metastatic disease.  \par \par 4/4/22 - Bone Scan:  No scan evidence of osseous metastasis.  Mild degenerative disease in the major joints.  \par \par 5/20/22 - MRI of Prostate:  Large tumor nearly diffusely involves the right half of the prostate with gross extracapsular extension.  No evidence of seminal vesicle invasion, pelvic lymphadenopathy, or aggressive osseous lesions.  PIRADS 5\par \par 6/3/22 - PSMA PET scan:  1. Difficult to delineate markedly intense radiotracer uptake in approximately 75% of the enlarged prostate gland corresponding to known prostate cancer.  2. Subcentimeter right pelvic/internal iliac lymph nodes with increased radiotracer uptake, likely representing metastatic disease. 3. Nonspecific subtle sclerosis surrounding a small lucent lesion in the right posterior iliac bone with minimal radiotracer activity.4.  Subcentimeter right upper and right middle lobe nodules, either not PSMA-avid or too small to characterize. These are indeterminate.\par \par 6/15/22 - underwent Robotic assisted laparoscopic radical prostatectomy with super-extended lymphadenectomy with Dr. AYAAN Kiran (urology).   [place] 4+5=9, 2/24 lymph nodes positive, Positive margin.  Extraprostatic extension, urinary bladder neck invasion, lymphovascular invasion and perineural invasion all present.  \par \par 6/24/22 - saw Dr. Kiran (urologist) in follow up.  Referral for radiation therapy made.  \par \par 7/29/22 - presents in consultation to discuss radiation therapy, accompanied by his elder sister. C/O 1-2 nocturia, weak stream,  and dribbling, uses diaper. Erectile dysfunction post surgery. PSA sent today.\par \par 9/6/22 - PSMA PET Scan: 1. Study is technically suboptimal due to infiltration of radiopharmaceutical. The study will be repeated at no additional charge. Repeat study is scheduled for 9/12/2022.  2. Status post interval prostatectomy. No evidence of PSMA-positive disease in prostate bed.  3. PSMA-positive right internal iliac lymph node, minimally increased in size since 6/1/2022, is compatible with metastasis. Previously seen PSMA-positive subcentimeter right pelvic lymph node has been resected.    4. A nonavid subcentimeter pulmonary nodule in the superior segment of the right lower lobe (image 145), and a 0.5 cm nodule in the peripheral right middle lobe (image 151), are unchanged, allowing for differences in CT technique.\par \par \par 9/12/22 - Repeat PSMA PET scan:  IMPRESSION:  Compared to whole body PSMA-PET/CT scan dated 6/1/2022:\par 1. Status post interval prostatectomy. Minimal avidity adjacent to surgical clip in the right prostatectomy bed, probably postsurgical.\par 2. PSMA-positive right internal iliac lymph node, minimally increased in size since 6/1/2022, is compatible with metastasis. Previously seen PSMA-positive subcentimeter right pelvic lymph node has been resected.\par 3. A non-FDG avid subcentimeter pulmonary nodule in superior segment of right lower lobe and 0.5 cm nodule in the peripheral right middle lobe are unchanged and remain indeterminate.\par 4. Interval resolution of minimal FDG avidity of the unchanged subtle sclerosis surrounding a small lucent lesion in the right posterior iliac bone\par \par \par 9/21/22 - presented for follow up visit.  Using 3 diapers per day. No erections. Feels he has some bilateral leg edema.  Started on the Bicalutamide after the PSMA PET scan last week.   \par Received Eligard injection in [place].  \par \par 10/6/22 - started on RADIATION Therapy to Prostate Bed/Nodes, 6250 cGy in 25 fx planned \par \par 10/10/22 -  OTV 3/25 fx completed.  Patient feeling feel, reports decreased urinary leakage, requiring 2 diapers/day.  Patient obtained Eligard injection while in [place], he does not recall the date.  He will look it up and let us know.  Denies nocturia or weak stream, no issues with bowels.\par \par 10/17/22 - OTV 7/25 fx completed. feeling well overall. notes some increased frequency and urgency. no bowel trouble. no pain or irritation.\par \par 10/26/22 - OTV 15/25 fx completed.  \par \par 10/31/22- OTV 19/25 fx completed. Mr. Huynh is feeling well overall.  He denies any new urinary issues or bowel issues.  Looks forward to completing radiation next week.  \par

## 2022-11-01 PROCEDURE — 77387B: CUSTOM | Mod: 26

## 2022-11-01 PROCEDURE — 77427 RADIATION TX MANAGEMENT X5: CPT

## 2022-11-02 PROCEDURE — 77387B: CUSTOM | Mod: 26

## 2022-11-04 PROCEDURE — 77387B: CUSTOM | Mod: 26

## 2022-11-07 ENCOUNTER — NON-APPOINTMENT (OUTPATIENT)
Age: 57
End: 2022-11-07

## 2022-11-07 LAB — PSA SERPL-MCNC: 0.04 NG/ML

## 2022-11-07 PROCEDURE — 77387B: CUSTOM | Mod: 26

## 2022-11-07 NOTE — HISTORY OF PRESENT ILLNESS
[FreeTextEntry1] : Mr. Huynh is a 57 year old male who is being seen for an on treatment visit.  He is unaccompanied for today's visit.  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  Pre-op .0 ng/mL 3/25/22.\par \par PSA Trend:\par 2/11/22 -   291.70 ng/mL\par 3/25/22 -  306.00\par 7/29/22 -  7.91\par 9/16/22  -  9.52 \par \par HPI: \par 2/11/22 -  .70 ng/mL, done in [place].  Biopsy was recommended but patient was unable to get back to [place] at that time.   \par \par 3/25/22 - saw Dr. SEN Wong (urology) in consultation.  PSA repeated and biopsy recommended.  \par  ng/mL \par \par 3/28/22 - underwent Prostate Biopsy:  Adenocarcinoma of the Prostate, Faina score 4+4=8 in 6/12 cores, 100% max. involvement.  (Dr. Wong - urology)\par \par 4/2/22 - CT A/P:  Prostate is enlarged with enhancing mass in the right side of the prostate gland, measuring 4.5 x 4.1 x 5.3 cm.  No evidence of metastatic disease.  \par \par 4/4/22 - Bone Scan:  No scan evidence of osseous metastasis.  Mild degenerative disease in the major joints.  \par \par 5/20/22 - MRI of Prostate:  Large tumor nearly diffusely involves the right half of the prostate with gross extracapsular extension.  No evidence of seminal vesicle invasion, pelvic lymphadenopathy, or aggressive osseous lesions.  PIRADS 5\par \par 6/3/22 - PSMA PET scan:  1. Difficult to delineate markedly intense radiotracer uptake in approximately 75% of the enlarged prostate gland corresponding to known prostate cancer.  2. Subcentimeter right pelvic/internal iliac lymph nodes with increased radiotracer uptake, likely representing metastatic disease. 3. Nonspecific subtle sclerosis surrounding a small lucent lesion in the right posterior iliac bone with minimal radiotracer activity.4.  Subcentimeter right upper and right middle lobe nodules, either not PSMA-avid or too small to characterize. These are indeterminate.\par \par 6/15/22 - underwent Robotic assisted laparoscopic radical prostatectomy with super-extended lymphadenectomy with Dr. AYAAN Kiran (urology).   Faina 4+5=9, 2/24 lymph nodes positive, Positive margin.  Extraprostatic extension, urinary bladder neck invasion, lymphovascular invasion and perineural invasion all present.  \par \par 6/24/22 - saw Dr. Kiran (urologist) in follow up.  Referral for radiation therapy made.  \par \par 7/29/22 - presents in consultation to discuss radiation therapy, accompanied by his elder sister. C/O 1-2 nocturia, weak stream,  and dribbling, uses diaper. Erectile dysfunction post surgery. PSA sent today.\par \par 9/6/22 - PSMA PET Scan: 1. Study is technically suboptimal due to infiltration of radiopharmaceutical. The study will be repeated at no additional charge. Repeat study is scheduled for 9/12/2022.  2. Status post interval prostatectomy. No evidence of PSMA-positive disease in prostate bed.  3. PSMA-positive right internal iliac lymph node, minimally increased in size since 6/1/2022, is compatible with metastasis. Previously seen PSMA-positive subcentimeter right pelvic lymph node has been resected.    4. A nonavid subcentimeter pulmonary nodule in the superior segment of the right lower lobe (image 145), and a 0.5 cm nodule in the peripheral right middle lobe (image 151), are unchanged, allowing for differences in CT technique.\par \par \par 9/12/22 - Repeat PSMA PET scan:  IMPRESSION:  Compared to whole body PSMA-PET/CT scan dated 6/1/2022:\par 1. Status post interval prostatectomy. Minimal avidity adjacent to surgical clip in the right prostatectomy bed, probably postsurgical.\par 2. PSMA-positive right internal iliac lymph node, minimally increased in size since 6/1/2022, is compatible with metastasis. Previously seen PSMA-positive subcentimeter right pelvic lymph node has been resected.\par 3. A non-FDG avid subcentimeter pulmonary nodule in superior segment of right lower lobe and 0.5 cm nodule in the peripheral right middle lobe are unchanged and remain indeterminate.\par 4. Interval resolution of minimal FDG avidity of the unchanged subtle sclerosis surrounding a small lucent lesion in the right posterior iliac bone\par \par \par 9/21/22 - presented for follow up visit.  Using 3 diapers per day. No erections. Feels he has some bilateral leg edema.  Started on the Bicalutamide after the PSMA PET scan last week.   \par Received Eligard injection in [place].  \par \par 10/6/22 - started on RADIATION Therapy to Prostate Bed/Nodes, 6250 cGy in 25 fx planned \par \par 10/10/22 -  OTV 3/25 fx completed.  Patient feeling feel, reports decreased urinary leakage, requiring 2 diapers/day.  Patient obtained Eligard injection while in [place], he does not recall the date.  He will look it up and let us know.  Denies nocturia or weak stream, no issues with bowels.\par \par 10/17/22 - OTV 7/25 fx completed. feeling well overall. notes some increased frequency and urgency. no bowel trouble. no pain or irritation.\par \par 10/26/22 - OTV 15/25 fx completed.  \par \par 10/31/22- OTV 19/25 fx completed. Mr. Huynh is feeling well overall.  He denies any new urinary issues or bowel issues.  Looks forward to completing radiation next week.  \par \par 11/7/22 - OTV 23/25 fx completed.\par Post treatment evaluation appointment scheduled for 12/19/22.  \par

## 2022-11-07 NOTE — DISEASE MANAGEMENT
[Pathological] : TNM Stage: p [TTNM] : 3a [NTNM] : 1 [MTNM] : 0 [IIIB] : IIIB [de-identified] : 5750cGy [de-identified] : 1121sVe [de-identified] : Prostate Bed/Nodes  [>20] : >20 ng/mL [Biopsy] : Patient had a biopsy on [8] : Template Biopsy Faina Score: 8 [] : Patient had a Prostate MRI [5] : 5 [Extracapsular Extension] : Extracapsular extension [BiopsyDate] : 3/28/22 [MeasuredProstateVolume] : 63 [TotalCores] : 12 [TotalPositiveCores] : 6 [MaxCoreInvolvement] : 100 [CTresults] : 4/2/22 - Prostate neoplasm.  No evidence of metastatic disease.   [BoneScanResults] : 4/4/22 - No scan evidence of osseous metastasis. Mild degenerative disease in the major joints.   [Radical Prostatectomy] : Radical Prostatectomy [Patient had a radical prostatectomy] : Patient had a radical prostatectomy  [9] : [place] Score 9 [Positive] : Positive margins [3] : T3 [a] : a [1] : N1 [Treatment with androgen ablation] : Treatment with androgen ablation [RadicalProstatectomyDate] : 6/15/22 [TotalNumberofnodesresected] : 24 [PositiveNodes] : 2 [ADTStartedDate] : 9/2022

## 2022-11-07 NOTE — REVIEW OF SYSTEMS
[Anal Pain: Grade 0] : Anal Pain: Grade 0 [Constipation: Grade 0] : Constipation: Grade 0 [Diarrhea: Grade 0] : Diarrhea: Grade 0 [Proctitis: Grade 0] : Proctitis: Grade 0 [Rectal Pain: Grade 0] : Rectal Pain: Grade 0 [Hematuria: Grade 0] : Hematuria: Grade 0 [Urinary Incontinence: Grade 1 - Occasional (e.g., with coughing, sneezing, etc.), pads not indicated] : Urinary Incontinence: Grade 1 - Occasional (e.g., with coughing, sneezing, etc.), pads not indicated [Urinary Retention: Grade 0] : Urinary Retention: Grade 0 [Urinary Tract Pain: Grade 0] : Urinary Tract Pain: Grade 0 [Urinary Urgency: Grade 1 - Present] : Urinary Urgency: Grade 1 - Present [Urinary Frequency: Grade 1 - Present] : Urinary Frequency: Grade 1 - Present [Erectile Dysfunction: Grade 2 - Decrease in erectile function (frequency/rigidity of erections), erectile intervention indicated, (e.g., medication or mechanical devices such as penile pump)] : Erectile Dysfunction: Grade 2 - Decrease in erectile function (frequency/rigidity of erections), erectile intervention indicated, (e.g., medication or mechanical devices such as penile pump)

## 2022-11-08 PROCEDURE — 77387B: CUSTOM | Mod: 26

## 2022-11-09 PROCEDURE — 77014: CPT | Mod: 26

## 2022-11-09 PROCEDURE — 77427 RADIATION TX MANAGEMENT X5: CPT

## 2022-11-16 ENCOUNTER — NON-APPOINTMENT (OUTPATIENT)
Age: 57
End: 2022-11-16

## 2022-12-15 NOTE — ED ADULT NURSE NOTE - IN THE PAST 12 MONTHS HAVE YOU USED DRUGS OTHER THAN THOSE REQUIRED FOR MEDICAL REASON?
Jonathan Mancera (:  1945) is a 68 y.o. male,Established patient, here for evaluation of the following chief complaint(s):  Surgical Consult         ASSESSMENT/PLAN:  1. Infrarenal abdominal aortic aneurysm (AAA) without rupture    This is a 68year old male patient who presents for repeat discussion about his AAA repair. Jimenez Bowens reviewed imaging and patient is candidate for f-EVAR. He is also candidate for open AAA repair. Another lengthy discussion about the details of both including the open variety requiring a line, central line, NG tube, jennings placement with associated risks of MI, death, bleeding, infection, sexual dysfunction, renal insufficiency given possible need for suprarenal clamp placement and respiratory insufficiency. The issue with f-EVAR of course is need for long term surveillance, stent fracture/occlusion, renal artery occlusion, endoleak, access site complications. Overall it seems patient is leaning towards endo repair but wants to consider for a little more time. Once again formally recommend repair of his 5.6 cm AAA. All questions answered. Subjective   SUBJECTIVE/OBJECTIVE:  This is a 68year old male patient who presents for repeat discussion about open versus f-EVAR repair of his AAA. He denies any change in symptoms. He is somewhat interested in open repair but has a fear of significant pain. Objective   Physical Exam  Constitutional:       Appearance: Normal appearance. Cardiovascular:      Rate and Rhythm: Normal rate and regular rhythm. Pulses: Normal pulses. Pulmonary:      Effort: Pulmonary effort is normal. No respiratory distress. Abdominal:      Palpations: Abdomen is soft. Tenderness: There is no abdominal tenderness. Skin:     General: Skin is warm and dry. Neurological:      Mental Status: He is alert.         Mai Matthews DO, FACS, FSVS, [address] [facility]      On this date 12/15/2022 I have spent 30 minutes reviewing previous notes, test results and face to face with the patient discussing the diagnosis and importance of compliance with the treatment plan as well as documenting on the day of the visit. No

## 2022-12-19 ENCOUNTER — APPOINTMENT (OUTPATIENT)
Dept: RADIATION ONCOLOGY | Facility: CLINIC | Age: 57
End: 2022-12-19

## 2022-12-19 VITALS
WEIGHT: 207.5 LBS | RESPIRATION RATE: 16 BRPM | TEMPERATURE: 97.7 F | HEART RATE: 88 BPM | HEIGHT: 72 IN | OXYGEN SATURATION: 100 % | BODY MASS INDEX: 28.1 KG/M2 | DIASTOLIC BLOOD PRESSURE: 73 MMHG | SYSTOLIC BLOOD PRESSURE: 117 MMHG

## 2022-12-19 PROCEDURE — 99024 POSTOP FOLLOW-UP VISIT: CPT

## 2022-12-19 NOTE — HISTORY OF PRESENT ILLNESS
[FreeTextEntry1] : Mr. Huynh is a 57 year old male who is being seen in post treatment evaluation appointment.  He is unaccompanied for today's visit.  \par \par Diagnosis:  pT3a pN1 M0 High Risk Adenocarcinoma of the Prostate, s/p Robotic Assisted Laparoscopic Radical Prostatectomy with superior-extended lymphadenectomy 6/15/22, [place] 4+5=9, 2/24 lymph nodes positive (left and right obdurator with extranodal extension), Positive margin right bladder neck >3mm positive margin.  Pre-op .0 ng/mL 3/25/22.\par \par PSA Trend:\par 2/11/22 -   291.70 ng/mL\par 3/25/22 -  306.00\par 7/29/22 -  7.91\par 9/16/22  -  9.52 \par 11/7/22 - 0.04 \par \par HPI: \par 2/11/22 -  .70 ng/mL, done in [place].  Biopsy was recommended but [place] was unable [place] at [place].   \par \par 3/25/22 - saw Dr. SEN Wong (urology) in consultation.  PSA repeated and biopsy recommended.  \par  ng/mL \par \par 3/28/22 - underwent Prostate Biopsy:  Adenocarcinoma of the Prostate, Faina score 4+4=8 in 6/12 cores, 100% max. involvement.  (Dr. Wong - urology)\par \par 5/20/22 - MRI of Prostate:  Large tumor nearly diffusely involves the right half of the prostate with gross extracapsular extension.  No evidence of seminal vesicle invasion, pelvic lymphadenopathy, or aggressive osseous lesions.  PIRADS 5\par \par 6/3/22 - PSMA PET scan:  1. Difficult to delineate markedly intense radiotracer uptake in approximately 75% of the enlarged prostate gland corresponding to known prostate cancer.  2. Subcentimeter right pelvic/internal iliac lymph nodes with increased radiotracer uptake, likely representing metastatic disease. 3. Nonspecific subtle sclerosis surrounding a small lucent lesion in the right posterior iliac bone with minimal radiotracer activity.4.  Subcentimeter right upper and right middle lobe nodules, either not PSMA-avid or too small to characterize. These are indeterminate.\par \par 6/15/22 - underwent Robotic assisted laparoscopic radical prostatectomy with super-extended lymphadenectomy with Dr. AYAAN Kiran (urology).   [place] 4+5=9, 2/24 lymph nodes positive, Positive margin.  Extraprostatic extension, urinary bladder neck invasion, lymphovascular invasion and perineural invasion all present.  \par \par 6/24/22 - saw Dr. Kiran (urologist) in follow up.  Referral for radiation therapy made.  \par \par 9/6/22 - PSMA PET Scan: 1. Study is technically suboptimal due to infiltration of radiopharmaceutical. The study will be repeated at no additional charge. Repeat study is scheduled for 9/12/2022.  2. Status post interval prostatectomy. No evidence of PSMA-positive disease in prostate bed.  3. PSMA-positive right internal iliac lymph node, minimally increased in size since 6/1/2022, is compatible with metastasis. Previously seen PSMA-positive subcentimeter right pelvic lymph node has been resected.    4. A nonavid subcentimeter pulmonary nodule in the superior segment of the right lower lobe (image 145), and a 0.5 cm nodule in the peripheral right middle lobe (image 151), are unchanged, allowing for differences in CT technique.\par \par \par 9/12/22 - Repeat PSMA PET scan:  IMPRESSION:  Compared to whole body PSMA-PET/CT scan dated 6/1/2022:\par 1. Status post interval prostatectomy. Minimal avidity adjacent to surgical clip in the right prostatectomy bed, probably postsurgical.\par 2. PSMA-positive right internal iliac lymph node, minimally increased in size since 6/1/2022, is compatible with metastasis. Previously seen PSMA-positive subcentimeter right pelvic lymph node has been resected.\par 3. A non-FDG avid subcentimeter pulmonary nodule in superior segment of right lower lobe and 0.5 cm nodule in the peripheral right middle lobe are unchanged and remain indeterminate.\par 4. Interval resolution of minimal FDG avidity of the unchanged subtle sclerosis surrounding a small lucent lesion in the right posterior iliac bone\par \par 9/2022 -  Received Eligard injection in [place] (thinks it was a 6 month dose)\par \par 10/6/22 - 11/9/22:  received RADIATION Therapy to Prostate Bed/Nodes, 6250 cGy in 25 fx with ADT \par \par 12/19/22 - presents for post treatment evaluation appointment.  Mr. Huynh is feeling well.  He is up 2 times a night to urinate, denies any bowel issues.  He has erectile dysfunction which he would like to address now that his treatment is over.   IPSS 8 / EPIC 15 \par \par

## 2022-12-19 NOTE — DISEASE MANAGEMENT
[Pathological] : TNM Stage: p [IIIB] : IIIB [>20] : >20 ng/mL [Biopsy] : Patient had a biopsy on [8] : Template Biopsy Faina Score: 8 [] : Patient had a Prostate MRI [5] : 5 [Extracapsular Extension] : Extracapsular extension [Radical Prostatectomy] : Radical Prostatectomy [Radiation Therapy] : Radiation  Therapy [Patient had a radical prostatectomy] : Patient had a radical prostatectomy  [9] : [place] Score 9 [Positive] : Positive margins [3] : T3 [a] : a [1] : N1 [Treatment with radiation therapy] : Treatment with radiation therapy [EBRT] : EBRT [Treatment with androgen ablation] : Treatment with androgen ablation [TTNM] : 3a [NTNM] : 1 [MTNM] : 0 [BiopsyDate] : 3/28/22 [MeasuredProstateVolume] : 63 [TotalCores] : 12 [TotalPositiveCores] : 6 [MaxCoreInvolvement] : 100 [CTresults] : 4/2/22 - Prostate neoplasm.  No evidence of metastatic disease.   [BoneScanResults] : 4/4/22 - No scan evidence of osseous metastasis. Mild degenerative disease in the major joints.   [RadicalProstatectomyDate] : 6/15/22 [TotalNumberofnodesresected] : 24 [PositiveNodes] : 2 [RadiationCompletedDate] : 11/2022 [EBRTDose] : 0447 [EBRTFractions] : 25 [ADTStartedDate] : 9/2022

## 2022-12-19 NOTE — REVIEW OF SYSTEMS
[Anal Pain: Grade 0] : Anal Pain: Grade 0 [Constipation: Grade 0] : Constipation: Grade 0 [Diarrhea: Grade 0] : Diarrhea: Grade 0 [Proctitis: Grade 0] : Proctitis: Grade 0 [Rectal Pain: Grade 0] : Rectal Pain: Grade 0 [Hematuria: Grade 0] : Hematuria: Grade 0 [Urinary Retention: Grade 0] : Urinary Retention: Grade 0 [Urinary Tract Pain: Grade 0] : Urinary Tract Pain: Grade 0 [Urinary Frequency: Grade 1 - Present] : Urinary Frequency: Grade 1 - Present [Erectile Dysfunction: Grade 2 - Decrease in erectile function (frequency/rigidity of erections), erectile intervention indicated, (e.g., medication or mechanical devices such as penile pump)] : Erectile Dysfunction: Grade 2 - Decrease in erectile function (frequency/rigidity of erections), erectile intervention indicated, (e.g., medication or mechanical devices such as penile pump) [IPSS Score (0-40): ___] : IPSS score: [unfilled] [EPIC-CP Score (0-60): ___] : EPIC-CP score: [unfilled] [Fatigue: Grade 0] : Fatigue: Grade 0 [Urinary Incontinence: Grade 0] : Urinary Incontinence: Grade 0  [Urinary Urgency: Grade 1 - Present] : Urinary Urgency: Grade 1 - Present [FreeTextEntry2] : occasional dribbling of urine

## 2022-12-19 NOTE — VITALS
[Maximal Pain Intensity: 0/10] : 0/10 [Least Pain Intensity: 0/10] : 0/10 [NoTreatment Scheduled] : no treatment scheduled [90: Able to carry normal activity; minor signs or symptoms of disease.] : 90: Able to carry normal activity; minor signs or symptoms of disease.  [ECOG Performance Status: 0 - Fully active, able to carry on all pre-disease performance without restriction] : Performance Status: 0 - Fully active, able to carry on all pre-disease performance without restriction

## 2022-12-20 ENCOUNTER — NON-APPOINTMENT (OUTPATIENT)
Age: 57
End: 2022-12-20

## 2022-12-21 LAB — PSA SERPL-MCNC: 0.02 NG/ML

## 2023-01-18 ENCOUNTER — NON-APPOINTMENT (OUTPATIENT)
Age: 58
End: 2023-01-18

## 2023-04-12 ENCOUNTER — NON-APPOINTMENT (OUTPATIENT)
Age: 58
End: 2023-04-12

## 2023-06-02 ENCOUNTER — APPOINTMENT (OUTPATIENT)
Dept: RADIATION ONCOLOGY | Facility: CLINIC | Age: 58
End: 2023-06-02
Payer: MEDICAID

## 2023-06-02 VITALS
HEART RATE: 102 BPM | DIASTOLIC BLOOD PRESSURE: 82 MMHG | OXYGEN SATURATION: 98 % | SYSTOLIC BLOOD PRESSURE: 138 MMHG | WEIGHT: 215.5 LBS | RESPIRATION RATE: 17 BRPM | TEMPERATURE: 97.1 F | BODY MASS INDEX: 29.23 KG/M2

## 2023-06-02 DIAGNOSIS — R39.9 UNSPECIFIED SYMPTOMS AND SIGNS INVOLVING THE GENITOURINARY SYSTEM: ICD-10-CM

## 2023-06-02 DIAGNOSIS — R32 UNSPECIFIED URINARY INCONTINENCE: ICD-10-CM

## 2023-06-02 DIAGNOSIS — C61 MALIGNANT NEOPLASM OF PROSTATE: ICD-10-CM

## 2023-06-02 PROCEDURE — 99213 OFFICE O/P EST LOW 20 MIN: CPT

## 2023-06-02 RX ORDER — POLYETHYLENE GLYCOL 3350 17 G/17G
17 POWDER, FOR SOLUTION ORAL
Qty: 238 | Refills: 0 | Status: DISCONTINUED | COMMUNITY
Start: 2022-09-08 | End: 2023-05-31

## 2023-06-02 RX ORDER — BISACODYL 5 MG/1
5 TABLET ORAL
Qty: 4 | Refills: 0 | Status: DISCONTINUED | COMMUNITY
Start: 2022-09-08 | End: 2023-05-31

## 2023-06-02 RX ORDER — TADALAFIL 5 MG/1
5 TABLET ORAL
Qty: 90 | Refills: 3 | Status: DISCONTINUED | COMMUNITY
Start: 2022-12-19 | End: 2023-06-02

## 2023-06-02 RX ORDER — TRIPTORELIN PAMOATE 22.5 MG
22.5 KIT INTRAMUSCULAR ONCE
Qty: 1 | Refills: 0 | Status: DISCONTINUED | COMMUNITY
Start: 2022-09-26 | End: 2023-06-02

## 2023-06-02 RX ORDER — BICALUTAMIDE 50 MG/1
50 TABLET ORAL DAILY
Qty: 84 | Refills: 3 | Status: DISCONTINUED | COMMUNITY
Start: 2022-07-29 | End: 2023-06-02

## 2023-06-02 NOTE — PHYSICAL EXAM
[Normal] : well developed, well nourished, in no acute distress [Sclera] : the sclera and conjunctiva were normal [] : no respiratory distress [Outer Ear] : the ears and nose were normal in appearance

## 2023-06-02 NOTE — DISEASE MANAGEMENT
[>20] : >20 ng/mL [Biopsy] : Patient had a biopsy on [8] : Template Biopsy Faina Score: 8 [] : Patient had a Prostate MRI [5] : 5 [Extracapsular Extension] : Extracapsular extension [IIIB] : IIIB [Radical Prostatectomy] : Radical Prostatectomy [Radiation Therapy] : Radiation  Therapy [Patient had a radical prostatectomy] : Patient had a radical prostatectomy  [9] : [place] Score 9 [Positive] : Positive margins [3] : T3 [a] : a [1] : N1 [Treatment with radiation therapy] : Treatment with radiation therapy [EBRT] : EBRT [Treatment with androgen ablation] : Treatment with androgen ablation [Pathological] : TNM Stage: p [BiopsyDate] : 3/28/22 [MeasuredProstateVolume] : 63 [TotalCores] : 12 [TotalPositiveCores] : 6 [MaxCoreInvolvement] : 100 [CTresults] : 4/2/22 - Prostate neoplasm.  No evidence of metastatic disease.   [BoneScanResults] : 4/4/22 - No scan evidence of osseous metastasis. Mild degenerative disease in the major joints.   [RadicalProstatectomyDate] : 6/15/22 [TotalNumberofnodesresected] : 24 [PositiveNodes] : 2 [RadiationCompletedDate] : 11/2022 [EBRTDose] : 1198 [EBRTFractions] : 25 [ADTStartedDate] : 9/2022 [TTNM] : 3a [NTNM] : 1 [MTNM] : 0

## 2023-06-02 NOTE — VITALS
[NoTreatment Scheduled] : no treatment scheduled [Maximal Pain Intensity: 0/10] : 0/10 [Least Pain Intensity: 0/10] : 0/10 [90: Able to carry normal activity; minor signs or symptoms of disease.] : 90: Able to carry normal activity; minor signs or symptoms of disease.  [ECOG Performance Status: 0 - Fully active, able to carry on all pre-disease performance without restriction] : Performance Status: 0 - Fully active, able to carry on all pre-disease performance without restriction

## 2023-06-02 NOTE — HISTORY OF PRESENT ILLNESS
[FreeTextEntry1] : Mr. Huynh is a 57 year old male who is being seen for a follow up call.   He is unaccompanied for today's visit. 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  Pre-op .0 ng/mL 3/25/22.\par \par PSA Trend:\par 2/11/22 -   291.70 ng/mL\par 3/25/22 -  306.00\par 7/29/22 -  7.91\par 9/16/22  -  9.52 \par 11/7/22 - 0.04 \par 12/19/22 - 0.02\par 5/15/23 - 0.01 (done in [place]) \par \par HPI : \par 2/11/22 -  .70 ng/mL, done in [place].  Biopsy was recommended but patient was unable to get back to [place] at that time.   \par \par 3/25/22 - saw Dr. SEN Wong (urology) in consultation.  PSA repeated and biopsy recommended.  \par  ng/mL \par \par 3/28/22 - underwent Prostate Biopsy:  Adenocarcinoma of the Prostate, Faina score 4+4=8 in 6/12 cores, 100% max. involvement.  (Dr. Wong - urology)\par \par 5/20/22 - MRI of Prostate:  Large tumor nearly diffusely involves the right half of the prostate with gross extracapsular extension.  No evidence of seminal vesicle invasion, pelvic lymphadenopathy, or aggressive osseous lesions.  PIRADS 5\par \par 6/3/22 - PSMA PET scan:  1. Difficult to delineate markedly intense radiotracer uptake in approximately 75% of the enlarged prostate gland corresponding to known prostate cancer.  2. Subcentimeter right pelvic/internal iliac lymph nodes with increased radiotracer uptake, likely representing metastatic disease. 3. Nonspecific subtle sclerosis surrounding a small lucent lesion in the right posterior iliac bone with minimal radiotracer activity.4.  Subcentimeter right upper and right middle lobe nodules, either not PSMA-avid or too small to characterize. These are indeterminate.\par \par 6/15/22 - underwent Robotic assisted laparoscopic radical prostatectomy with super-extended lymphadenectomy with Dr. AYAAN Kiran (urology).   Faina 4+5=9, 2/24 lymph nodes positive, Positive margin.  Extraprostatic extension, urinary bladder neck invasion, lymphovascular invasion and perineural invasion all present.  \par \par 6/24/22 - saw Dr. Kiran (urologist) in follow up.  Referral for radiation therapy made.  \par \par 9/6/22 - PSMA PET Scan: 1. Study is technically suboptimal due to infiltration of radiopharmaceutical. The study will be repeated at no additional charge. Repeat study is scheduled for 9/12/2022.  2. Status post interval prostatectomy. No evidence of PSMA-positive disease in prostate bed.  3. PSMA-positive right internal iliac lymph node, minimally increased in size since 6/1/2022, is compatible with metastasis. Previously seen PSMA-positive subcentimeter right pelvic lymph node has been resected.    4. A nonavid subcentimeter pulmonary nodule in the superior segment of the right lower lobe (image 145), and a 0.5 cm nodule in the peripheral right middle lobe (image 151), are unchanged, allowing for differences in CT technique.\par \par 9/12/22 - Repeat PSMA PET scan:  IMPRESSION:  Compared to whole body PSMA-PET/CT scan dated 6/1/2022:\par 1. Status post interval prostatectomy. Minimal avidity adjacent to surgical clip in the right prostatectomy bed, probably postsurgical.\par 2. PSMA-positive right internal iliac lymph node, minimally increased in size since 6/1/2022, is compatible with metastasis. Previously seen PSMA-positive subcentimeter right pelvic lymph node has been resected.\par 3. A non-FDG avid subcentimeter pulmonary nodule in superior segment of right lower lobe and 0.5 cm nodule in the peripheral right middle lobe are unchanged and remain indeterminate.\par 4. Interval resolution of minimal FDG avidity of the unchanged subtle sclerosis surrounding a small lucent lesion in the right posterior iliac bone\par \par 9/2022 -  Received Eligard injection in [place] (thinks it was a 6 month dose)\par \par 10/6/22 - 11/9/22:  received RADIATION Therapy to Prostate Bed/Nodes, 6250 cGy in 25 fx with ADT \par \par 12/19/22 - presented for post treatment evaluation appointment.  Mr. Huynh is feeling well.  He is up 2 times a night to urinate, denies any bowel issues.  He has erectile dysfunction which he would like to address now that his treatment is over.   IPSS 8 / EPIC 15  To start Cialis.  Follow up 6 months. \par \par 4/15/23 - received Eligard injection in [place] (6 month dose)\par \par 6/2/23 - presents for follow up visit.  Mr. Huynh is doing alright but complaining of increased incontinence of urine .. using 7-8 pads a day.  Also has urinary frequency.  Nocturia 2x a night. Denies any bowel issues.  IPSS 5 / QOL 2 / EPIC 25

## 2023-06-02 NOTE — REVIEW OF SYSTEMS
[IPSS Score (0-40): ___] : IPSS score: [unfilled] [EPIC-CP Score (0-60): ___] : EPIC-CP score: [unfilled] [Hot Flashes] : hot flashes [Negative] : Heme/Lymph [Anal Pain: Grade 0] : Anal Pain: Grade 0 [Constipation: Grade 0] : Constipation: Grade 0 [Diarrhea: Grade 0] : Diarrhea: Grade 0 [Proctitis: Grade 0] : Proctitis: Grade 0 [Rectal Pain: Grade 0] : Rectal Pain: Grade 0 [Hematuria: Grade 0] : Hematuria: Grade 0 [Urinary Incontinence: Grade 2 - Spontaneous; pads indicated; limiting instrumental ADL] : Urinary Incontinence: Grade 2 - Spontaneous; pads indicated; limiting instrumental ADL [Urinary Retention: Grade 0] : Urinary Retention: Grade 0 [Urinary Tract Pain: Grade 0] : Urinary Tract Pain: Grade 0 [Urinary Urgency: Grade 0] : Urinary Urgency: Grade 0 [Urinary Frequency: Grade 1 - Present] : Urinary Frequency: Grade 1 - Present [Ejaculation Disorder: Grade 1 - Diminished ejaculation] : Ejaculation Disorder: Grade 1 - Diminished ejaculation  [Erectile Dysfunction: Grade 2 - Decrease in erectile function (frequency/rigidity of erections), erectile intervention indicated, (e.g., medication or mechanical devices such as penile pump)] : Erectile Dysfunction: Grade 2 - Decrease in erectile function (frequency/rigidity of erections), erectile intervention indicated, (e.g., medication or mechanical devices such as penile pump) [FreeTextEntry2] : worsened incontinence

## 2023-10-01 PROBLEM — Z92.3 HISTORY OF RADIATION THERAPY: Status: RESOLVED | Noted: 2022-10-10 | Resolved: 2023-10-01
